# Patient Record
Sex: FEMALE | Race: BLACK OR AFRICAN AMERICAN | Employment: FULL TIME | URBAN - METROPOLITAN AREA
[De-identification: names, ages, dates, MRNs, and addresses within clinical notes are randomized per-mention and may not be internally consistent; named-entity substitution may affect disease eponyms.]

---

## 2022-04-11 ENCOUNTER — TELEPHONE (OUTPATIENT)
Dept: SURGERY | Age: 47
End: 2022-04-11

## 2022-04-11 NOTE — TELEPHONE ENCOUNTER
Need patients new insurance. Called patient and left a voicemail to remind her of her upcoming appointment, our cancellation policy, our late policy and no visitor rule. Unable to ask COVID-19 screening questions due to no answer.

## 2022-04-12 ENCOUNTER — OFFICE VISIT (OUTPATIENT)
Dept: SURGERY | Age: 47
End: 2022-04-12
Payer: COMMERCIAL

## 2022-04-12 ENCOUNTER — TELEPHONE (OUTPATIENT)
Dept: SURGERY | Age: 47
End: 2022-04-12

## 2022-04-12 VITALS
RESPIRATION RATE: 18 BRPM | DIASTOLIC BLOOD PRESSURE: 85 MMHG | OXYGEN SATURATION: 96 % | HEIGHT: 65 IN | WEIGHT: 262.6 LBS | BODY MASS INDEX: 43.75 KG/M2 | HEART RATE: 94 BPM | SYSTOLIC BLOOD PRESSURE: 132 MMHG | TEMPERATURE: 98 F

## 2022-04-12 DIAGNOSIS — K21.9 CHRONIC GERD: ICD-10-CM

## 2022-04-12 DIAGNOSIS — E66.01 MORBID OBESITY (HCC): Primary | ICD-10-CM

## 2022-04-12 DIAGNOSIS — R11.10 REGURGITATION OF FOOD: ICD-10-CM

## 2022-04-12 PROCEDURE — S2083 ADJUSTMENT GASTRIC BAND: HCPCS | Performed by: NURSE PRACTITIONER

## 2022-04-12 PROCEDURE — 99202 OFFICE O/P NEW SF 15 MIN: CPT | Performed by: NURSE PRACTITIONER

## 2022-04-12 NOTE — TELEPHONE ENCOUNTER
Patient called and said she was seen this morning by Lelo Ponce. Patient stated Rebeka Busch put her on a liquid diet. Patient would like to talk more to Rebeka Busch about this and if Rebeka Busch could email her a list of the best liquid diets or what she recommends. Please advise.

## 2022-04-12 NOTE — PROGRESS NOTES
Chief Complaint   Patient presents with   [address] [facility] Patient     bariatric f/u     Dysphagia       Char Johnson presents 12 years status post laparoscopic adjustable gastric band for treatment of morbid obesity for obesity management and to reestablish bariatric care. She has not been in bariatric follow-up for more than 5 years. She says recently she feels like something \"is not right\". This morning she had a hard time tolerating a protein shake. She said she burps a lot and feels like liquids just sit in her chest.  This has been worsening over the past week. She said no associated epigastric or port site pain, no fevers or chills, no hematemesis and no melena. Normally she said she self-induced vomiting 3-4 times a week because she feels miserable like it sitting in her chest.  She developed maladaptive eating and relies heavily on snack items and junk because they go down. She has difficulty tolerating textured and solid foods. Band type AP standard with last recorded fill volume of 8.5 mL (11/19/2013)    Preop weight  320 pounds  Ang   ±250 pounds  Current weight  262 pounds    Denies port site pain  Denies hunger    Says she has been trying to diet and recently lost about 15 pounds  She has been on and off phentermine did not feel like it helped very much  Her only medication is Cymbalta    Visit Vitals  /85 (BP 1 Location: Right arm, BP Patient Position: Sitting, BP Cuff Size: Adult)   Pulse 94   Temp 98 °F (36.7 °C) (Oral)   Resp 18   Ht 5' 5\" (1.651 m)   Wt 262 lb 9.6 oz (119.1 kg)   SpO2 96%   BMI 43.70 kg/m²     A + O x 3  Chest  CTA  COR  RRR  ABD Soft, obese, port site is palpable and nontender left mid abdomen/ND, +BS. EXT No edema; ambulating independently      ICD-10-CM ICD-9-CM    1. Morbid obesity ([facility] [address]  E66.01 278.01 ADJUSTMENT GASTRIC BAND      XR UPPER GI SERIES W KUB      REFERRAL TO GASTROENTEROLOGY   2.  BMI 40.0-44.9, adult ([facility])  Z68.41 V85.41 ADJUSTMENT GASTRIC BAND XR UPPER GI SERIES W KUB      REFERRAL TO GASTROENTEROLOGY   3. Regurgitation of food  R11.10 787.03 ADJUSTMENT GASTRIC BAND      XR UPPER GI SERIES W KUB      REFERRAL TO GASTROENTEROLOGY   4. Chronic GERD  K21.9 530.81 ADJUSTMENT GASTRIC BAND      XR UPPER GI SERIES W KUB      REFERRAL TO GASTROENTEROLOGY     12 years status post laparoscopic adjustable gastric band for treatment of morbid obesity  Worsening dysphagia and symptoms concerning for gastric band slip  Recommended band decompression today and then proceed with upper GI and upper endoscopy to further evaluate for band slip, hiatal hernia, band erosion   Reviewed maladaptive eating behavior and how that is a contributor to weight gain  Briefly discussed with her surgical options including #1 band removal and refer to medical weight loss, #2 band removal and at a later date revised to a gastric bypass  She may require more urgent band removal and then at a later date consider gastric bypass  I have asked her to meet with a dietitian  For now stay on a bariatric liquid diet   Lap Band adjustment  Procedure    Verbal consent was obtained. The patient was placed in the supine position. The port area was prepped using sterile technique and a Bingham needle was inserted. The port was accessed with ease (with valsalva). Previous Fill Volume:  8.5 cc. Removed:  7 cc clear appearing fluid   Patient tolerated the procedure well. She did feel somewhat gurgly and was belching with a couple ounces of water but it was tolerable and she did not vomit  Again I reminded her to stay on a full liquid diet  Have her follow-up after the upper GI and endoscopy to discuss surgical options further with Dr. Dimas Briones  If she develops abdominal pain and/or intolerance to liquid she needs to be seen promptly for more urgent band removal    Ifrah Ortega verbalized understanding and questions were answered to the best of my knowledge and ability.     Diet, UGI and EGD educational materials were provided.

## 2022-04-12 NOTE — PATIENT INSTRUCTIONS
Next Steps:    Upper GI xray and see me in the office after the xray (you will need an appt [phone])  Central Scheduling contact   [phone]     Upper endoscopy Dr. Meghna Lebron [phone]     After upper GI and endoscopy plan on an appt with Fracisco Gotti MD to discuss options    - band removal    - possible revision to gastric bypass        make an appointment with one of the bariatric dietitians, please call the bariatric line at [phone]. Appointments are offered in person and virtual at no charge.

## 2022-04-12 NOTE — PROGRESS NOTES
1. Have you been to the ER, urgent care clinic since your last visit? Hospitalized since your last visit? No     2. Have you seen or consulted any other health care providers outside of the [address] [place] [address] since your last visit? Include any pap smears or colon screening.  No      Neck: 14in  Waist:46.5in  Hips: 52.5in

## 2022-04-12 NOTE — TELEPHONE ENCOUNTER
Bariatric liquids/ stage I soft  and will send via My Chart   Tolerated soup at lunch   UGI is Friday and EGD 5/11

## 2022-04-13 ENCOUNTER — DOCUMENTATION ONLY (OUTPATIENT)
Dept: SURGERY | Age: 47
End: 2022-04-13

## 2022-04-13 ENCOUNTER — TELEPHONE (OUTPATIENT)
Dept: SURGERY | Age: 47
End: 2022-04-13

## 2022-04-13 NOTE — PROGRESS NOTES
Faxed referral to [facility] with confirmation. Requested a URGENT appointment.

## 2022-04-13 NOTE — TELEPHONE ENCOUNTER
Called pt to see what her plan was to see if she wanted the band removal only or if she is considering the band removal with the gastric bypass. Pt answered and stated that she does not know what she plans to continue with at the moment and will call me to let me know on what she has decided with. Gave pt my direct number to keep me updated. Will wait for pt to let me know before moving forward.

## 2022-04-15 ENCOUNTER — HOSPITAL ENCOUNTER (OUTPATIENT)
Dept: GENERAL RADIOLOGY | Age: 47
Discharge: HOME | End: 2022-04-15
Attending: NURSE PRACTITIONER
Payer: COMMERCIAL

## 2022-04-15 DIAGNOSIS — R11.10 REGURGITATION OF FOOD: ICD-10-CM

## 2022-04-15 DIAGNOSIS — E66.01 MORBID OBESITY (HCC): ICD-10-CM

## 2022-04-15 DIAGNOSIS — K21.9 CHRONIC GERD: ICD-10-CM

## 2022-04-15 PROCEDURE — 74240 X-RAY XM UPR GI TRC 1CNTRST: CPT

## 2022-08-11 PROBLEM — D64.9 ANEMIA: Status: ACTIVE | Noted: 2022-08-11

## 2022-08-11 PROBLEM — F32.9 MAJOR DEPRESSIVE DISORDER: Status: ACTIVE | Noted: 2022-08-11

## 2022-08-11 PROBLEM — Z86.59 PERSONAL HISTORY OF MENTAL DISORDER: Status: ACTIVE | Noted: 2022-08-11

## 2022-08-11 PROBLEM — R63.5 ABNORMAL WEIGHT GAIN: Status: ACTIVE | Noted: 2022-08-11

## 2022-08-11 PROBLEM — Z86.2 PERSONAL HISTORY OF DISEASES OF BLOOD AND BLOOD-FORMING ORGANS: Status: ACTIVE | Noted: 2022-08-11

## 2022-08-11 PROBLEM — E78.00 HIGH CHOLESTEROL: Status: ACTIVE | Noted: 2022-08-11

## 2022-08-11 RX ORDER — LANOLIN ALCOHOL/MO/W.PET/CERES
325 CREAM (GRAM) TOPICAL 3 TIMES DAILY
COMMUNITY
Start: 2022-05-17

## 2022-08-11 RX ORDER — PHENTERMINE HYDROCHLORIDE 37.5 MG/1
37.5 TABLET ORAL DAILY
COMMUNITY
Start: 2022-06-24

## 2022-08-16 ENCOUNTER — PATIENT MESSAGE (OUTPATIENT)
Dept: SURGERY | Age: 47
End: 2022-08-16

## 2022-08-16 DIAGNOSIS — K21.9 CHRONIC GERD: Primary | ICD-10-CM

## 2022-08-16 DIAGNOSIS — R63.5 WEIGHT GAIN: ICD-10-CM

## 2022-08-18 NOTE — TELEPHONE ENCOUNTER
From: Andi Scott  To: Edison Mar NP  Sent: 8/16/2022 9:49 AM EDT  Subject: Daija Carlton Valadez    Good morning,  I would like to make an appointment to have my lapband refilled. Since the fluid was taken out I have gained a substantial amount of weight.      Thank you,  Quentin Aguila

## 2022-08-24 ENCOUNTER — HOSPITAL ENCOUNTER (OUTPATIENT)
Dept: GENERAL RADIOLOGY | Age: 47
Discharge: HOME | End: 2022-08-24
Attending: NURSE PRACTITIONER
Payer: COMMERCIAL

## 2022-08-24 DIAGNOSIS — K21.9 CHRONIC GERD: ICD-10-CM

## 2022-08-24 DIAGNOSIS — R63.5 WEIGHT GAIN: ICD-10-CM

## 2022-08-24 PROCEDURE — 74240 X-RAY XM UPR GI TRC 1CNTRST: CPT

## 2022-09-13 ENCOUNTER — OFFICE VISIT (OUTPATIENT)
Dept: SURGERY | Age: 47
End: 2022-09-13
Payer: COMMERCIAL

## 2022-09-13 VITALS
BODY MASS INDEX: 47.62 KG/M2 | OXYGEN SATURATION: 97 % | DIASTOLIC BLOOD PRESSURE: 87 MMHG | HEART RATE: 85 BPM | RESPIRATION RATE: 15 BRPM | SYSTOLIC BLOOD PRESSURE: 137 MMHG | HEIGHT: 65 IN | WEIGHT: 285.8 LBS | TEMPERATURE: 98 F

## 2022-09-13 DIAGNOSIS — K95.09 GASTRIC BAND SLIPPAGE: ICD-10-CM

## 2022-09-13 DIAGNOSIS — R63.5 WEIGHT GAIN: ICD-10-CM

## 2022-09-13 DIAGNOSIS — E66.01 MORBID OBESITY (HCC): Primary | ICD-10-CM

## 2022-09-13 DIAGNOSIS — Z98.84 HISTORY OF LAPAROSCOPIC ADJUSTABLE GASTRIC BANDING: ICD-10-CM

## 2022-09-13 PROCEDURE — 99213 OFFICE O/P EST LOW 20 MIN: CPT | Performed by: NURSE PRACTITIONER

## 2022-09-13 NOTE — PROGRESS NOTES
Chief Complaint   Patient presents with    Morbid Obesity     Discuss band fill. Maximo Roman presents 12+ years status post laparoscopic adjustable gastric band for treatment of morbid obesity for obesity management hoping for an adjustment today. She has gained another 20 lbs. Band was decompressed a few months ago due to early signs of band slip. She has experienced additional weight gain and wants to \"keep trying with the band\"   No epigastric or port site pain, no fevers or chills, no hematemesis and no melena. Can \"eat whatever I want now\" and zero restriction   She had developed maladaptive eating and relied heavily on snack items and junk because they go down. Band type        AP standard with last recorded fill volume of 8.5 mL (11/19/2013)    4/12/22 decompressed and fill volume 1 ml      Preop weight               320 pounds  Ang                            ±250 pounds  Current weight             285 pounds     Denies port site pain  Denies hunger  Primary caregiver for aging parents. Father has dementia and is on dialysis and mother has mobility issues + she works FT     Visit Vitals  /87 (BP 1 Location: Right upper arm, BP Patient Position: Sitting, BP Cuff Size: Large adult)   Pulse 85   Temp 98 °F (36.7 °C) (Oral)   Resp 15   Ht 5' 5\" (1.651 m)   Wt 285 lb 12.8 oz (129.6 kg)   SpO2 97%   BMI 47.56 kg/m²     A + O x 3  Chest  CTA  COR  RRR  ABD Soft, NT/ND, +BS, port faintly palpable left mid abd and non tender   EXT No edema; ambulating independently         ICD-10-CM ICD-9-CM    1. Morbid obesity ([address]  E66.01 278.01       2. BMI 45.0-49.9, adult ([facility])  Z68.42 V85.42       3. History of laparoscopic adjustable gastric banding  Z98.84 V45.86       4. Weight gain  R63.5 783.1       5.  Gastric band slippage  K95.09 996.59         UGI images reviewed facet to face with patient   UGI recently was improved from April   Agreed to small adjustment, but was clear I did not think it would be helpful and she should consider band removal and revision to a gastric bypass   At best post band her BMI remained > 40 and she has developed poor eating behaviors     Lap Band adjustment  Procedure    Verbal consent was obtained. The patient was placed in the supine and standing (both attempted) position. The port area was prepped using sterile technique and a Bingham needle was inserted. The port was accessed NOT accessed. Port is flipped and attempts were made by Malik Quintana NP and myself without success. Previous Fill Volume:  1 ml. Port unable to access (could feel the metal portion of the port and was very tilted/ flipped     Patient tolerated the procedure well. Referred to bariatric seminar, RD evaluation and then to see Dr. Beth Araiza to discuss revision to gastric bypass   She has [facility] care and will need 12 month diet/wellness program per GIA guidelines which she understands    I have encouraged her to reach out to [facility] for some guidance and support with her parents   Vivian Stone verbalized understanding and questions were answered to the best of my knowledge and ability. Gastric bypass educational materials were provided.

## 2022-09-13 NOTE — PROGRESS NOTES
1. Have you been to the ER, urgent care clinic since your last visit? Hospitalized since your last visit? No    2. Have you seen or consulted any other health care providers outside of the [address] [place] [address] since your last visit? Include any pap smears or colon screening.  yes

## 2022-09-13 NOTE — PATIENT INSTRUCTIONS
NEXT STEPS:     Surgery type: Possible revision to a gastric bypass   Surgeon:  Ashanti Lim MD      Watch the On line seminar on gastric bypass. Go to [employer] to register      2. Make an appointment with one of the bariatric dietitians to get started with your nutrition classes. Please call the bariatric line at [phone]. Appointments are offered in person and virtual at no charge. 3. After the seminar and dietician appt, make an appt to see Dr. Ashanti Lim to talk about gastric bypass       **Insurance requires a certain number of months of supervised counseling for weight loss, exercise and nutrition before approval for surgery. **    Also with regard to your parents, you can reach out to the [employer] or [employer] and they are a great resource for caregivers, children of aging parents and they may be able to help guide you as you care for your parents.

## 2022-09-15 ENCOUNTER — TELEPHONE (OUTPATIENT)
Dept: SURGERY | Age: 47
End: 2022-09-15

## 2022-09-15 NOTE — TELEPHONE ENCOUNTER
Spoke with Ms Umu Key regarding her steps Yunior wanted her to complete before coming in to see Dr Veronica Villagran. Pt aware of the steps below and once completed she can reach me to schedule her appt. I have emailed her the steps as to the e-mail she requested. Nutrition Eval - Please call [phone]    Online Seminar - [email or web address]    Appt with Dr Shahida Rodriguez once the two steps above have been completed - Call me at [phone]

## 2022-09-27 ENCOUNTER — CLINICAL SUPPORT (OUTPATIENT)
Dept: SURGERY | Age: 47
End: 2022-09-27

## 2022-09-27 DIAGNOSIS — E66.01 MORBID OBESITY (HCC): Primary | ICD-10-CM

## 2022-09-27 NOTE — PROGRESS NOTES
Pre-operative Bariatric Nutrition Evaluation - Phone Consult     Date: 2022   Fanny Meneses M.D. Name: Domonique Salgado  :  1975  Age:  41  Gender: Female   Type of Surgery: [x]           Gastric Bypass   []           Sleeve Gastrectomy    ASSESSMENT:    Medications/Supplements:   Prior to Admission medications    Medication Sig Start Date End Date Taking? Authorizing Provider   ferrous sulfate 325 mg (65 mg iron) tablet Take 325 mg by mouth three (3) times daily. 22   Provider, Historical   phentermine (ADIPEX-P) 37.5 mg tablet Take 37.5 mg by mouth in the morning. 22   Provider, Historical   CYMBALTA 30 mg capsule  10/16/13   Provider, Historical       Food Allergies/Intolerances:lactose intolerance     Anthropometrics:    Ht:65\"   Recent Office Wt: 285#    IBW: 135#    %IBW: 211%     BMI:47    Category: obesity III     Reported wt history: Pt completing pre-op nutrition evaluation for wt loss surgery over the phone. Pt with previous LAGB with Dr. Estee Farmer over 10 years ago. States initially did well and lost about 50#. Reports lowest adult BW of 225# and highest adult BW of 285#. Attributes wt gain over the years r/t emotional eating. Has attempted wt loss through various methods with most successful wt loss of 50#. Pt's band has flipped and has since been deflated. Has been unable to maintain long term or significant wt loss and is now seeking approval for weight loss surgery. Pt will need to complete 12 months of health coaching through [employer] and at least 4 nutrition visits with our program per our program requirements.       Exercise/Physical Activity: was previously exercising in the morning (walking or You Tube videos) 2-3 times per week (2 months ago)     Reported Diet History: LAGB     24 Hour Diet Recall - drinks a lot of calories; long stretches of time without eating, 1st meal of day at 4 pm  Breakfast  Usually skips or if cooks at home (sausage, grits)    Lunch Salads (sometimes homemade, sometimes take out) with chicken, veggies, feta cheese, dressing ([place] or [place] dressing)    Dinner  Take out    [place] at night - watching tv, stress with care taking role - fries, chocolate chip cookies   Beverages  Coffee, latte, sweet tea, diet soda, water        Environment/Psychosocial/Support:Pt works for [employer] and works from home majority of the time (in the office 2 days per week). 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 Pt grocery shops for her household as well as her parents. Pt's brother had a gastric bypass. NUTRITION DIAGNOSIS:  Physical inactivity r/t multiple etiologies evidenced by pt with no current exercise. Excessive energy intake r/t food and beverage choices evidenced by diet recall. Heavy reliance on take out/restaurant meals. Beverage choices are mostly sugar-sweetened. NUTRITION INTERVENTION:  Pt educated on nutrition recommendations for weight loss surgery, specifically gastric bypass. Instructed on consuming 3 meals per day starting now. Use the balanced plate method to plan meals, include 3 oz of lean source of protein, 1/2 cup whole grains, unlimited non-starchy vegetables, 1/2 cup fruit and 1 serving of low fat dairy. Utilize handouts listing healthy snack and meal ideas to limit restaurant meals. After surgery measure all meals to 1/2 cup. Each meal will contain a 1/4 cup lean protein and 1/4 cup fruit, non-starchy vegetable or starch (limiting to once per day). Aim for 60 g protein per day. Sip on 48-64 oz of sugar free, calorie free, non-carbonated beverages each day. Do not use a straw. Do not consume beverages 30 minutes before, during or 30 minutes after meals. Read all nutrition labels. Demonstrated and emphasized identifying serving size, total fat, sugar and protein content. Defined low fat as </= 3 g per serving.  Discussed lean and extra lean sources of protein. Provided list of low fat cooking methods. Avoid foods with sugar listed in the first 3 ingredients and >/15 g sugar per serving. Excess sugar/fat intake may lead to dumping syndrome. Discussed signs and symptoms of dumping syndrome. Practice mindful eating habits; take small bites, chew thoroughly, avoid distractions, utilize hunger/fullness scale. Consume meals over 20-30 minutes. Attend Bariatric Support Group and increase physical activity (approved per MD) for long term weight maintenance. NUTRITION MONITORING AND EVALUATION:    The following goals were established with patient; Implement physical activity/exercise. We discussed multiple benefits of exercising including stress management, decreased night time snacking, improved sleep patterns. Replace current beverages with sugar-free and non-carbonated alternatives. Eventually will need to decrease frequency of eating out to no more than 2-3 meals per week, more meal prepping/cooking at home for optimal weight loss and to prevent risk for dumping syndrome. Eat more consistently rather than skipping meals. This may also help improve snacking habits later in the day/night. Can use protein shakes in place of skipping meals. Print/review nutrition education materials. Follow up next month for continued nutrition education and supervised weight loss. Specific tips and techniques to facilitate compliance with above recommendations were provided and discussed. Nutrition evaluation reveals important lifestyle changes are indicated. Goals set and recommendations made. Will continue to assess. If further details are desired please feel free to contact me at [phone]. This phone number was also provided to the patient for any further questions or concerns.            Osmin Davis RD

## 2022-11-21 ENCOUNTER — OFFICE VISIT (OUTPATIENT)
Dept: SURGERY | Age: 47
End: 2022-11-21

## 2022-11-21 ENCOUNTER — CLINICAL SUPPORT (OUTPATIENT)
Dept: SURGERY | Age: 47
End: 2022-11-21

## 2022-11-21 VITALS
OXYGEN SATURATION: 97 % | RESPIRATION RATE: 18 BRPM | TEMPERATURE: 98.6 F | HEART RATE: 94 BPM | SYSTOLIC BLOOD PRESSURE: 139 MMHG | BODY MASS INDEX: 48.82 KG/M2 | WEIGHT: 293 LBS | HEIGHT: 65 IN | DIASTOLIC BLOOD PRESSURE: 82 MMHG

## 2022-11-21 VITALS — BODY MASS INDEX: 48.92 KG/M2 | WEIGHT: 293 LBS

## 2022-11-21 DIAGNOSIS — E66.01 MORBID OBESITY (HCC): Primary | ICD-10-CM

## 2022-11-21 DIAGNOSIS — K95.09 GASTRIC BAND MALFUNCTION: ICD-10-CM

## 2022-11-21 PROCEDURE — 99214 OFFICE O/P EST MOD 30 MIN: CPT | Performed by: SURGERY

## 2022-11-21 NOTE — PROGRESS NOTES
Bariatric Surgery Consultation    CC: Obesity with history of gastric band  Subjective: The patient is a 41 y.o. obese  female with a Body mass index is 49.92 kg/m². . They have been considering surgery for some time now. The patient presents to the clinic today to discuss surgical weight loss options. They have made multiple attempts at weight loss over the years without success. They have tried diet, exercise, pills, and gastric band. Unfortunately, none of these have lead to meaningful, sustained weight loss. The patient now suffers from multiple medical conditions related to their obesity including joint pain HLD. Relevant previous surgical history includes: Lap band with HH repair. They have attended the bariatric seminar before the visit. The patient's goals for the surgery include: lose weight. Tobacco use: no  GERD/hiatal hernia: no. EGD done in May showed a 2 cm hiatal hernia with no band erosion. No reflux. Sleep Apnea assessment:    STOPBANG questionnaire     Do you Snore loudly? no  Do you often feel Tired, fatigued, or sleepy during the daytime? yes  Has anyone Observed you stop breathing during your sleep? no  Are you being treated for high blood Pressure? no  BMI more than 35 kg/m2?  yes  Age over 48years old? no  Neck Circumference >16 inches? no  Gender male? no  ______________________________________     SCORE: 2     If YES to 0 - 2, low risk of sleep apnea  If YES to 3 - 4 intermediate risk of having sleep apnea  If YES to 5 - 8 high risk of having sleep apnea (or 2 + BMI 35 or Neck > 17\" or Male)       Patient Active Problem List    Diagnosis Date Noted    Anemia 08/11/2022    Abnormal weight gain 08/11/2022    Major depressive disorder 08/11/2022    Personal history of diseases of blood and blood-forming organs 08/11/2022    Personal history of mental disorder 08/11/2022    High cholesterol 08/11/2022    Encounter for adjustment of gastric lap band 08/13/2010 Obesities, morbid ([facility] 10/29/2009      Past Medical History:   Diagnosis Date    Abnormal weight gain 8/11/2022    Anemia 8/11/2022    Edema leg     edilma LE    High cholesterol 8/11/2022    Hypertension     Major depressive disorder 8/11/2022    Obesities, morbid ([facility] 10/29/2009    Personal history of diseases of blood and blood-forming organs 8/11/2022    Personal history of mental disorder 8/11/2022     Past Surgical History:   Procedure Laterality Date    HC LAP BAND ADJUST PROCEDURE  2/16/10    fluoro    HC LAP BAND ADJUST PROCEDURE  4/12/10    office    HC LAP BAND ADJUST PROCEDURE  5/24/10    office    HC LAP BAND ADJUST PROCEDURE  6/23/10    office    SD ABDOMEN SURGERY PROC UNLISTED      SD LAP, PLACE ADJUST GASTR BAND  1-6-10    AP Std    REPAIR DIAPHR HERNIA,THOR/ABD  1-6-10    HH repair w/ LAGB      Social History     Tobacco Use    Smoking status: Never    Smokeless tobacco: Never   Substance Use Topics    Alcohol use: No      Family History   Problem Relation Age of Onset    Hypertension Mother     Diabetes Mother     OSTEOARTHRITIS Mother     Other Mother         obesity    Other Father         obesity    Kidney Disease Father     Hypertension Father     Hypertension Sister     Other Sister         obesity    Hypertension Brother     Other Brother         obesity      Prior to Admission medications    Medication Sig Start Date End Date Taking? Authorizing Provider   ferrous sulfate 325 mg (65 mg iron) tablet Take 325 mg by mouth three (3) times daily.  5/17/22  Yes Provider, Historical   phentermine (ADIPEX-P) 37.5 mg tablet Take 37.5 mg by mouth in the morning. 6/24/22  Yes Provider, Historical   CYMBALTA 30 mg capsule  10/16/13  Yes Provider, Historical     Allergies   Allergen Reactions    Amoxicillin Hives        Review of Systems:    Constitutional: No fever or chills  Neurologic: No headache  Eyes: No scleral icterus or irritated eyes  Nose: No nasal pain or drainage  Mouth: No oral lesions or sore throat  Cardiac: No palpations or chest pain  Pulmonary: No cough or shortness of breath  Gastrointestinal: No nausea, emesis, diarrhea, or constipation  Genitourinary: No dysuria  Musculoskeletal: No muscle or joint tenderness  Skin: No rashes or lesions  Psychiatric: No anxiety or depressed mood      Objective:     Physical Exam:  Visit Vitals  /82 (BP 1 Location: Left lower arm, BP Patient Position: Sitting, BP Cuff Size: Large adult)   Pulse 94   Temp 98.6 °F (37 °C) (Oral)   Resp 18   Ht 5' 5\" (1.651 m)   Wt 300 lb (136.1 kg)   SpO2 97%   BMI 49.92 kg/m²     General: No acute distress, conversant  Eyes: PERRLA, no scleral icterus  HENT: Normocephalic without oral lesions  Neck: Trachea midline without LAD  Cardiac: Normal pulse rate and rhythm  Pulmonary: Symmetric chest rise with normal effort  GI: Soft, obese, NT, ND, no hernia, no splenomegaly  Skin: Warm without rash  Extremities: No edema or joint stiffness  Psych: Appropriate mood and affect    Assessment:     Morbid obesity with comorbidities  Plan:   The patient presents today with multiple complications relating to their obesity as detailed above. The patient has made multiple unsuccessful attempts at weight loss as detailed above. The patient desires surgical weight loss for a better chance of lifelong weight control and control of co-morbidities. They have attended the bariatric seminar and meet the qualifications for surgery based on the NIH criteria. We have discussed the various surgical options including the robotic sleeve gastrectomy, robotic gastric bypass, laparoscopic vs robotic single anastomosis duodenal ileostomy (YOKASTA), and duodenal switch. We also discussed none operative alternatives. The patient is currently interested in the YOKASTA versus gastric bypass. I believe they are a good candidate for this procedure.     If she wants to proceed with the YOKASTA I will remove her band in the near future and perform the YOKASTA at a later date.  If bypass okay for a 1 stage procedure. They will need to a H pylori antigen ordered. The patient will be required to not gain weight during this period if starting BMI is 35-40, lose 5% of starting weight if BMI is >40, or lose 10% of starting weight if BMI >60 during the supervised weight loss / pre operative process before our next visit for pre-operative consents. The goal for this patient is to lose 15 lbs. We have discussed the possible complications of bariatric surgery which include but are not limited to failed weight loss, weight regain, malnutrition, leak, bleed, stricture, gastric ulcer, gastric fistula, gastric bleed, gallstones, new or worsening gastric reflux, nausea, emesis, internal hernia, abdominal wall hernia, gastric perforation, need for revision / conversion / or reversal, pregnancy complications and loss, intestinal ischemia, post operative skin complications, possible thinning of their hair, bowel obstruction, dumping syndrome, wound infection, blood clots (DVT, mesenteric thrombus, pulmonary embolism), increased addictive tendency, risk of anesthesia, and death. The patient understands this is a life altering decision and will require compliance to the program for the remainder of their life in order to be monitored to avoid complication and ensure successful, sustained weight control. They will be placed on a lifelong low carbohydrate and low sugar diet, exercise, and vitamin regimen and will require frequent blood draws and office visits to ensure adequate nutrition and program compliance. Visits and follow up will be in compliance with the guidelines set forth by [facility]. I have specifically mentioned the need to avoid all personal and second-hand tobacco exposure, systemic steroids, and NSAIDS after any bariatric surgery to help avoid the above listed complications.  The patient has expressed understanding of the above and would like to enroll in the program. The patient will be submitted for medical and psychological clearance along with establishing with out dietician and joining the pre / post operative support group. They will be screened for depression and sleep apnea and treated pre operatively if needed. The patient will have to demonstrate cessation of tobacco if relevant for at least 3 months preoperatively along with having a controlled HbA1c less than 8. After successful completion of the preoperative regimen the patient will be submitted for insurance approval and pending this will be scheduled for surgery. All questions from the patient have been answered and they have demonstrated appropriate understanding of the process.       Patient Active Problem List    Diagnosis Date Noted    Anemia 08/11/2022    Abnormal weight gain 08/11/2022    Major depressive disorder 08/11/2022    Personal history of diseases of blood and blood-forming organs 08/11/2022    Personal history of mental disorder 08/11/2022    High cholesterol 08/11/2022    Encounter for adjustment of gastric lap band 08/13/2010    Obesities, morbid ([facility] 10/29/2009         Signed By: Ricardo Clifford MD  Bariatric and General Surgeon  [facility]    November 21, 2022

## 2022-11-21 NOTE — PROGRESS NOTES
Pre-operative Bariatric Nutrition Evaluation - Office Visit Follow Up      Date: 2022   Avery Sultana M.D. Name: Black Freeman                  :   1975        Age:    41                    Gender: Female   Type of Surgery: [x]           Gastric Bypass                []           Sleeve Gastrectomy     ASSESSMENT:     Medications/Supplements:           Prior to Admission medications    Medication Sig Start Date End Date Taking? Authorizing Provider   ferrous sulfate 325 mg (65 mg iron) tablet Take 325 mg by mouth three (3) times daily. 22     Provider, Historical   phentermine (ADIPEX-P) 37.5 mg tablet Take 37.5 mg by mouth in the morning. 22     Provider, Historical   CYMBALTA 30 mg capsule   10/16/13     Provider, Historical         Food Allergies/Intolerances:lactose intolerance      Anthropometrics:    Ht:65\"                            Today's Office Wt: 294#   Previous Office Wt: 285#     Net Change: 9# wt gain                              BMI:48  (obesity III)      Reported wt history: Pt completing follow up nutrition evaluation and counseling in preparation for possible removal of LAGB and conversion to gastric bypass. Pt with previous LAGB with Dr. Shobha Gomez over 10 years ago. States initially did well and lost about 50#. Reports lowest adult BW of 225# and highest adult BW of 285#. Attributes wt gain over the years r/t emotional eating. Since her nutrition evaluation on 22 pt reports minimal lifestyle changes made d/t caring for ill father with dementia and on dialysis. States he has been hospitalized twice in the past few weeks. Reports difficulty focusing on her own needs. Reports she has increased her water intake but is still drinking sugar sweetened beverages. Has tried protein shakes but is not using them consistently. Today's wt indicates a 9# wt gain since her last office visit.  Pt is scheduled to meet with Dr. Judy Villanueva today to discuss revision options. Will then likely need to complete 12 months of health coaching through [facility] [employer] and 4-6 nutrition visits with our program.      Exercise/Physical Activity: none d/t lack of time     Reported Diet History: LAGB      24 Hour Diet Recall - drinks a lot of calories; long stretches of time without eating, 1st meal of day at 4 pm  Breakfast   Usually skips or if cooks at home (sausage, grits)    Lunch   Salads (sometimes homemade, sometimes take out) with chicken, veggies, feta cheese, dressing ([place] or [place] dressing)    Dinner   Take out    [employer] at night - watching tv, stress with care taking role - fries, chocolate chip cookies   Beverages   Coffee with sugar or Splenda, sugar sweetened tea,water          Environment/Psychosocial/Support: 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 Pt grocery shops for her household as well as her parents. Pt's brother lives in [place] and has had a gastric bypass within the past year. NUTRITION DIAGNOSIS:  Self-monitoring deficit rt multiple etiologies evidenced by pt skips meals and lacks adequate protein intake. Excessive energy intake r/t food and beverage choices evidenced by diet recall. Heavy reliance on take out/restaurant meals. Beverage choices are mostly sugar-sweetened. NUTRITION INTERVENTION:  Pt educated on nutrition recommendations for weight loss surgery, specifically gastric bypass. Instructed on consuming 3 meals per day starting now. Use the balanced plate method to plan meals, include 3 oz of lean source of protein, 1/2 cup whole grains, unlimited non-starchy vegetables, 1/2 cup fruit and 1 serving of low fat dairy. Utilize handouts listing healthy snack and meal ideas to limit restaurant meals. After surgery measure all meals to 1/2 cup.  Each meal will contain a 1/4 cup lean protein and 1/4 cup fruit, non-starchy vegetable or starch (limiting to once per day). Aim for 60 g protein per day. Sip on 48-64 oz of sugar free, calorie free, non-carbonated beverages each day. Do not use a straw. Do not consume beverages 30 minutes before, during or 30 minutes after meals. Read all nutrition labels. Demonstrated and emphasized identifying serving size, total fat, sugar and protein content. Defined low fat as </= 3 g per serving. Discussed lean and extra lean sources of protein. Provided list of low fat cooking methods. Avoid foods with sugar listed in the first 3 ingredients and >/15 g sugar per serving. Excess sugar/fat intake may lead to dumping syndrome. Discussed signs and symptoms of dumping syndrome. Practice mindful eating habits; take small bites, chew thoroughly, avoid distractions, utilize hunger/fullness scale. Consume meals over 20-30 minutes. Attend Bariatric Support Group and increase physical activity (approved per MD) for long term weight maintenance. NUTRITION MONITORING AND EVALUATION:     The following goals were established with patient;  Continue to increase water intake and decrease intake of sugar sweetened beverages. Can use calorie-free sweeteners PRN. Explained risk for dumping syndrome. Eat more consistently with 3 meals a day and 1 planned snack to achieve adequate protein intake. Use protein shakes PRN instead of skipping meals. List of recommended shakes provided. Review nutrition education materials provided today. Follow up with RD once next steps established for revision process. Specific tips and techniques to facilitate compliance with above recommendations were provided and discussed. Nutrition evaluation reveals important lifestyle changes are indicated. Goals set and recommendations made. Will continue to assess. If further details are desired please feel free to contact me at [phone]. This phone number was also provided to the patient for any further questions or concerns. Lacy Bourgeois, RD

## 2022-11-21 NOTE — PROGRESS NOTES
Identified pt with two pt identifiers (name and ). Reviewed chart in preparation for visit and have obtained necessary documentation. Spencer Almonte is a 41 y.o. female  No chief complaint on file. Visit Vitals  /82 (BP 1 Location: Left lower arm, BP Patient Position: Sitting, BP Cuff Size: Large adult)   Pulse 94   Temp 98.6 °F (37 °C) (Oral)   Resp 18   Ht 5' 5\" (1.651 m)   Wt 300 lb (136.1 kg)   SpO2 97%   BMI 49.92 kg/m²       1. Have you been to the ER, urgent care clinic since your last visit? Hospitalized since your last visit? No    2. Have you seen or consulted any other health care providers outside of the [address] [place] [address] since your last visit? Include any pap smears or colon screening.  No

## 2023-01-16 ENCOUNTER — HOSPITAL ENCOUNTER (EMERGENCY)
Age: 48
Discharge: HOME | End: 2023-01-16
Attending: EMERGENCY MEDICINE
Payer: COMMERCIAL

## 2023-01-16 VITALS
OXYGEN SATURATION: 98 % | SYSTOLIC BLOOD PRESSURE: 143 MMHG | BODY MASS INDEX: 48.82 KG/M2 | WEIGHT: 293 LBS | RESPIRATION RATE: 18 BRPM | HEIGHT: 65 IN | HEART RATE: 101 BPM | DIASTOLIC BLOOD PRESSURE: 75 MMHG | TEMPERATURE: 97.8 F

## 2023-01-16 DIAGNOSIS — U07.1 COVID-19: Primary | ICD-10-CM

## 2023-01-16 LAB
FLUAV RNA SPEC QL NAA+PROBE: NOT DETECTED
FLUBV RNA SPEC QL NAA+PROBE: NOT DETECTED
SARS-COV-2, COV2: DETECTED

## 2023-01-16 PROCEDURE — 99283 EMERGENCY DEPT VISIT LOW MDM: CPT

## 2023-01-16 PROCEDURE — 87636 SARSCOV2 & INF A&B AMP PRB: CPT

## 2023-01-16 NOTE — Clinical Note
Work/School Note    Date: 1/16/2023     To Whom It May concern:    Ayde Conti was evaluated by the following provider(s):  Attending Provider: Flip Walker MD.   Sujey Brionese virus is suspected. Per the CDC guidelines we recommend home isolation until the following conditions are all met:    1. At least five days have passed since symptoms first appeared and/or had a close exposure,   2. After home isolation for five days, wearing a mask around others for the next five days,  3. At least 24 have passed since last fever without the use of fever-reducing medications and  4.  Symptoms (eg cough, shortness of breath) have improved      Sincerely,          Sheri Freire MD

## 2023-01-16 NOTE — DISCHARGE INSTRUCTIONS
I recommend ibuprofen and Tylenol as needed for fever, body aches, headaches. Make sure you are drinking lots of water, Pedialyte or Gatorade. Claritin or Zyrtec which can be purchased over-the-counter can help with congestion and runny nose. If you test positive for COVID-19, stay home for at least 5 days and isolate from others in your home. You are likely most infectious during these first 5 days. You may end isolation after day 5 if you are fever-free for 24 hours (without the use of fever-reducing medication) and your symptoms are improving.

## 2023-01-16 NOTE — ED PROVIDER NOTES
,    EMERGENCY DEPARTMENT HISTORY AND PHYSICAL EXAM      Date: 1/16/2023  Patient Name: Lilly Mcardle    History of Presenting Illness     Chief Complaint   Patient presents with    Flu Like Symptoms       History Provided By: Patient    HPI: Lilly Mcardle, 41 y.o. female presents to the ED with CC of 1 day of cough, body aches, sore throat fevers, generalized weakness. She had some diarrhea yesterday. She has had her COVID shot but not flu shot. .       Patient denies SOB, chest pain, or any neurological symptoms. There are no other complaints, changes, or physical findings at this time. Past History     Past Medical History:  Past Medical History:   Diagnosis Date    Abnormal weight gain 8/11/2022    Anemia 8/11/2022    Edema leg     edilma LE    High cholesterol 8/11/2022    Hypertension     Major depressive disorder 8/11/2022    Obesities, morbid ([address] 10/29/2009    Personal history of diseases of blood and blood-forming organs 8/11/2022    Personal history of mental disorder 8/11/2022       Allergies: Allergies   Allergen Reactions    Amoxicillin Hives       Review of Systems   Vital signs and nursing notes reviewed  Review of Systems  As documetnted in HPI    Physical Exam   Visit Vitals  BP (!) 143/75   Pulse (!) 101   Temp 97.8 °F (36.6 °C)   Resp 18   Ht 5' 5\" (1.651 m)   Wt 136.1 kg (300 lb)   SpO2 98%   BMI 49.92 kg/m²     CONSTITUTIONAL: Alert, in no distress. Appears stated age. HEAD:  Normocephalic, atraumatic  EYES: EOM intact. No conjunctival injection or scleral icterus  Neck:  Supple. No meningismus  RESP: Normal with no work of breathing, speaking in full sentences. CV: Well perfused. NEURO: Alert with normal mentation, moving extremities spontaneously  PSYCH: Normal mood, normal affect   *    Medical Decision Making   Patient presents for COVID 19 testing with normal oxygen saturation and mild URI symptoms or COVID 19 exposure. COVID 19 testing was conducted and positive.  The patient was given quarantine/isolation recommendations and agrees with the plan to be discharged home. They were provided instructions to return for difficulty breathing, chest pain, altered mentation, or any other new or worsening symptoms. ED Course:   Initial assessment performed. The patients presenting problems have been discussed, and they are in agreement with the care plan formulated and outlined with them. I have encouraged them to ask questions as they arise throughout their visit. Critical Care Time: None    Disposition:  DISCHARGE NOTE:  The pt is ready for discharge. The pt's signs, symptoms, diagnosis, and discharge instructions have been discussed and pt has conveyed their understanding. The pt is to follow up as recommended or return to ER should their symptoms worsen. Plan has been discussed and pt is in agreement. PLAN:  1. Current Discharge Medication List        2. Follow-up Information       Follow up With Specialties Details Why Contact Info    Autumn Thornton NP Nurse Practitioner   [address] [place] [address]  [phone]            3. COVID Testing results will be called once available if positive. Patient should utilize [employer] to access results. 4. Take Tylenol or Ibuprofen as needed  5. Drink plenty of fluids  6. Return to ED if worse especially if any shortness of breath, chest pain or altered mentation. Diagnosis     Clinical Impression:   1. COVID-19        Please note that this dictation was completed with [employer], the computer voice recognition software. Quite often unanticipated grammatical, syntax, homophones, and other interpretive errors are inadvertently transcribed by the computer software. Please disregards these errors. Please excuse any errors that have escaped final proofreading.

## 2023-06-20 ENCOUNTER — TELEPHONE (OUTPATIENT)
Age: 48
End: 2023-06-20

## 2023-06-20 NOTE — TELEPHONE ENCOUNTER
Returned patients call regarding lab enter and PCP Support Form. Mailed info to patient.  [facility]

## 2024-04-12 ENCOUNTER — TELEPHONE (OUTPATIENT)
Age: 49
End: 2024-04-12

## 2024-04-23 ENCOUNTER — TELEPHONE (OUTPATIENT)
Age: 49
End: 2024-04-23

## 2024-04-23 NOTE — TELEPHONE ENCOUNTER
Attempted to call patient regarding [facility] Insurance Requirements. LVM to return call.

## 2025-02-06 ENCOUNTER — TELEPHONE (OUTPATIENT)
Age: 50
End: 2025-02-06

## 2025-02-06 NOTE — TELEPHONE ENCOUNTER
Identified patient with two patient identifiers (name and ). Reviewed chart in preparation for encounter and have obtained necessary documentation.     Called and spoke with patient regarding restart, Patient is scheduled with kyel for 2/10/25 at 1 PM.

## 2025-02-18 ENCOUNTER — OFFICE VISIT (OUTPATIENT)
Age: 50
End: 2025-02-18
Payer: COMMERCIAL

## 2025-02-18 ENCOUNTER — TELEPHONE (OUTPATIENT)
Age: 50
End: 2025-02-18

## 2025-02-18 ENCOUNTER — PREP FOR PROCEDURE (OUTPATIENT)
Age: 50
End: 2025-02-18

## 2025-02-18 VITALS
HEIGHT: 65 IN | OXYGEN SATURATION: 99 % | HEART RATE: 99 BPM | DIASTOLIC BLOOD PRESSURE: 83 MMHG | RESPIRATION RATE: 20 BRPM | TEMPERATURE: 97.9 F | BODY MASS INDEX: 48.82 KG/M2 | SYSTOLIC BLOOD PRESSURE: 139 MMHG | WEIGHT: 293 LBS

## 2025-02-18 DIAGNOSIS — E66.01 MORBID OBESITY: ICD-10-CM

## 2025-02-18 DIAGNOSIS — K95.09 GASTRIC BAND MALFUNCTION: ICD-10-CM

## 2025-02-18 DIAGNOSIS — E88.810 METABOLIC SYNDROME: ICD-10-CM

## 2025-02-18 DIAGNOSIS — K21.9 GASTROESOPHAGEAL REFLUX DISEASE, UNSPECIFIED WHETHER ESOPHAGITIS PRESENT: Primary | ICD-10-CM

## 2025-02-18 PROCEDURE — 99214 OFFICE O/P EST MOD 30 MIN: CPT | Performed by: SURGERY

## 2025-02-18 ASSESSMENT — PATIENT HEALTH QUESTIONNAIRE - PHQ9
SUM OF ALL RESPONSES TO PHQ QUESTIONS 1-9: 2
1. LITTLE INTEREST OR PLEASURE IN DOING THINGS: SEVERAL DAYS
2. FEELING DOWN, DEPRESSED OR HOPELESS: SEVERAL DAYS
SUM OF ALL RESPONSES TO PHQ QUESTIONS 1-9: 2
SUM OF ALL RESPONSES TO PHQ9 QUESTIONS 1 & 2: 2

## 2025-02-18 NOTE — PROGRESS NOTES
Surgery Progress Note    2/18/2025    CC: Restart weight loss process    Subjective:     Patient comes back in today after being seen in 2022.  Considering KAROL versus bypass.  History of gastric band.  Band is decompressed.  Nothing getting stuck.  GERD is minimal.  She has had weight gain over the last few years.  She was caring for her sick father who has now passed.  Now focusing on her health.    They have made multiple attempts at weight loss over the years without success. They have tried diet, exercise, pills, and gastric band. Unfortunately, none of these have lead to meaningful, sustained weight loss. The patient now suffers from multiple medical conditions related to their obesity including joint pain.  Relevant previous surgical history includes: Lap band with HH repair. They have attended the bariatric seminar before the visit. The patient's goals for the surgery include: lose weight.     Tobacco use: no  GERD/hiatal hernia: no.  EGD done 3 years ago showed hiatal hernia with no band erosion.  No reflux.  Sleep Apnea assessment:     STOPBANG questionnaire     Do you Snore loudly? no  Do you often feel Tired, fatigued, or sleepy during the daytime? yes  Has anyone Observed you stop breathing during your sleep? no  Are you being treated for high blood Pressure? no  BMI more than 35 kg/m2? yes  Age over 50 years old? no  Neck Circumference >16 inches? no  Gender male? no  ______________________________________     SCORE: 2     If YES to 0 - 2, low risk of sleep apnea  If YES to 3 - 4 intermediate risk of having sleep apnea  If YES to 5 - 8 high risk of having sleep apnea (or 2 + BMI 35 or Neck > 17\" or Male)     Constitutional: No fever or chills  Neurologic: No headache  Eyes: No scleral icterus or irritated eyes  Nose: No nasal pain or drainage  Mouth: No oral lesions or sore throat  Cardiac: No palpations or chest pain  Pulmonary: No cough or shortness of breath  Gastrointestinal: No nausea, emesis,

## 2025-02-18 NOTE — TELEPHONE ENCOUNTER
Spoke to patient to schedule her EGD with Dr Yeager at [facility].  I offered 2/24 she declined due to needing a ride, so I offered 3/3 or 3/24 she went with 3/24 @ 8:00am with arrival time of 6:30am.    I let the patient know they are required to bring someone with them that will be responsible to take them home along with their photo ID and insurance card.      If you are taking any weight lose injectable medication, you need to stop one week before procedure                            Trulicity (dulaglutide)                          Wegovy (Semaglutide)                          Ozempic (Semaglutide)                          Rybelsus (tirzepatide)                          Mounjaro (tirzepatide)                           Zepbound (tirzepatide)        I let them know once this is scheduled I will put the information in a letter along with where they need to go and pre-procedure instructions.   This letter will post to their my chart and go out in the mail.  She acknowledged thank you

## 2025-02-18 NOTE — PROGRESS NOTES
Identified patient with two patient identifiers (name and ). Reviewed chart in preparation for visit and have obtained necessary documentation.    Vandana Jeong is a 49 y.o. female  Chief Complaint   Patient presents with    Bariatric, Initial Visit     Restart     /83 (Site: Left Lower Arm, Position: Sitting, Cuff Size: Large Adult)   Pulse 99   Temp 97.9 °F (36.6 °C) (Oral)   Resp 20   Ht 1.651 m (5' 5\")   Wt (!) 148.4 kg (327 lb 3.2 oz)   LMP 2025 (Approximate)   SpO2 99%   BMI 54.45 kg/m²     1. Have you been to the ER, urgent care clinic since your last visit?  Hospitalized since your last visit?no    2. Have you seen or consulted any other health care providers outside of the [facility] since your last visit?  Include any pap smears or colon screening. no     Patients heart rate is 101, elevated above normal range.  2nd recheck shows patient is 99, within normal range.  Provider made aware.

## 2025-02-19 LAB
25(OH)D3+25(OH)D2 SERPL-MCNC: 24.2 NG/ML (ref 30–100)
ALBUMIN SERPL-MCNC: 3.9 G/DL (ref 3.9–4.9)
ALP SERPL-CCNC: 180 IU/L (ref 44–121)
ALT SERPL-CCNC: 14 IU/L (ref 0–32)
AST SERPL-CCNC: 18 IU/L (ref 0–40)
BASOPHILS # BLD AUTO: 0.1 X10E3/UL (ref 0–0.2)
BASOPHILS NFR BLD AUTO: 1 %
BILIRUB SERPL-MCNC: 0.3 MG/DL (ref 0–1.2)
BUN SERPL-MCNC: 14 MG/DL (ref 6–24)
BUN/CREAT SERPL: 19 (ref 9–23)
CALCIUM SERPL-MCNC: 9.6 MG/DL (ref 8.7–10.2)
CHLORIDE SERPL-SCNC: 101 MMOL/L (ref 96–106)
CHOLEST SERPL-MCNC: 243 MG/DL (ref 100–199)
CO2 SERPL-SCNC: 23 MMOL/L (ref 20–29)
CREAT SERPL-MCNC: 0.72 MG/DL (ref 0.57–1)
EGFRCR SERPLBLD CKD-EPI 2021: 104 ML/MIN/1.73
EOSINOPHIL # BLD AUTO: 0.1 X10E3/UL (ref 0–0.4)
EOSINOPHIL NFR BLD AUTO: 2 %
ERYTHROCYTE [DISTWIDTH] IN BLOOD BY AUTOMATED COUNT: 15.8 % (ref 11.7–15.4)
FOLATE SERPL-MCNC: 12.4 NG/ML
GLOBULIN SER CALC-MCNC: 3.6 G/DL (ref 1.5–4.5)
GLUCOSE SERPL-MCNC: 91 MG/DL (ref 70–99)
HBA1C MFR BLD: 5.3 % (ref 4.8–5.6)
HCT VFR BLD AUTO: 35.2 % (ref 34–46.6)
HDLC SERPL-MCNC: 66 MG/DL
HGB BLD-MCNC: 10.7 G/DL (ref 11.1–15.9)
IMM GRANULOCYTES # BLD AUTO: 0 X10E3/UL (ref 0–0.1)
IMM GRANULOCYTES NFR BLD AUTO: 0 %
IRON SATN MFR SERPL: 11 % (ref 15–55)
IRON SERPL-MCNC: 42 UG/DL (ref 27–159)
LDLC SERPL CALC-MCNC: 162 MG/DL (ref 0–99)
LYMPHOCYTES # BLD AUTO: 2 X10E3/UL (ref 0.7–3.1)
LYMPHOCYTES NFR BLD AUTO: 27 %
MCH RBC QN AUTO: 23.8 PG (ref 26.6–33)
MCHC RBC AUTO-ENTMCNC: 30.4 G/DL (ref 31.5–35.7)
MCV RBC AUTO: 78 FL (ref 79–97)
MONOCYTES # BLD AUTO: 0.6 X10E3/UL (ref 0.1–0.9)
MONOCYTES NFR BLD AUTO: 8 %
NEUTROPHILS # BLD AUTO: 4.7 X10E3/UL (ref 1.4–7)
NEUTROPHILS NFR BLD AUTO: 62 %
PLATELET # BLD AUTO: 485 X10E3/UL (ref 150–450)
POTASSIUM SERPL-SCNC: 4.8 MMOL/L (ref 3.5–5.2)
PROT SERPL-MCNC: 7.5 G/DL (ref 6–8.5)
RBC # BLD AUTO: 4.5 X10E6/UL (ref 3.77–5.28)
SODIUM SERPL-SCNC: 136 MMOL/L (ref 134–144)
TIBC SERPL-MCNC: 395 UG/DL (ref 250–450)
TRIGL SERPL-MCNC: 86 MG/DL (ref 0–149)
TSH SERPL DL<=0.005 MIU/L-ACNC: 5.83 UIU/ML (ref 0.45–4.5)
UIBC SERPL-MCNC: 353 UG/DL (ref 131–425)
VIT B12 SERPL-MCNC: 351 PG/ML (ref 232–1245)
VLDLC SERPL CALC-MCNC: 15 MG/DL (ref 5–40)
WBC # BLD AUTO: 7.5 X10E3/UL (ref 3.4–10.8)

## 2025-02-21 ENCOUNTER — OFFICE VISIT (OUTPATIENT)
Age: 50
End: 2025-02-21

## 2025-02-21 DIAGNOSIS — E66.01 MORBID OBESITY: Primary | ICD-10-CM

## 2025-02-21 LAB — VIT B1 BLD-SCNC: 88.6 NMOL/L (ref 66.5–200)

## 2025-02-21 NOTE — PROGRESS NOTES
Pre-operative Bariatric Nutrition Evaluation    Date: 2025              Session 1 of 6    Insurance:  [facility]            Physician/Surgeon: Dr. Erasmo Yeager    Vandana Jeong was evaluated through a synchronous (real-time) audio encounter. Patient identification was verified at the start of the visit.    The patient was located at Home: [address] [facility] [address].  The provider was located in [place], [place].  Confirm you are appropriately licensed, registered, or certified to deliver care in the state where the patient is located as indicated above. If you are not or unsure, please re-schedule the visit: Yes, I confirm.     Name: Vandana Jeong  :  1975  Age:  49  Gender: Female  Type of Surgery: [x]   Gastric Bypass   []    Sleeve Gastrectomy    ASSESSMENT:    Past Medical History: lapband     Medications/Supplements:   Prior to Admission medications    Medication Sig Start Date End Date Taking? Authorizing Provider   DULoxetine (CYMBALTA) 30 MG extended release capsule ceived the following from [facility]: Outside name: CYMBALTA 30 mg capsule 10/16/13   Automatic Reconciliation, Ar   ferrous sulfate (IRON 325) 325 (65 Fe) MG tablet Take 325 mg by mouth 3 times daily  Patient not taking: Reported on 2025   Automatic Reconciliation, Ar   phentermine (ADIPEX-P) 37.5 MG tablet Take 1 tablet by mouth daily. 22   Automatic Reconciliation, Ar     Smoking: None  Alcohol: None    Food Allergies/Intolerances: None    Anthropometrics:    Ht: 65 inches  Wt:  327 lbs    IBW: 125 lbs    %IBW: 261     BMI: 54  Category: Obesity class III    Reported wt history: Pt presents today for pre-op nutrition evaluation for wt loss surgery. Reports lowest adult BW of 220 lbs and highest adult BW of 327 lbs. Attributes wt gain over the years r/t stress (taking care of parents) . Has attempted wt loss through various methods with most successful wt loss of 50 lbs. Has

## 2025-02-24 ENCOUNTER — TELEPHONE (OUTPATIENT)
Age: 50
End: 2025-02-24

## 2025-02-24 NOTE — TELEPHONE ENCOUNTER
Attempted to contact pt to schedule In Person RASHAWN puckett with Rosaura MOMIN for pt to contact our office to schedule.

## 2025-03-03 RX ORDER — ERGOCALCIFEROL 1.25 MG/1
50000 CAPSULE, LIQUID FILLED ORAL WEEKLY
Qty: 12 CAPSULE | Refills: 1 | Status: SHIPPED | OUTPATIENT
Start: 2025-03-03

## 2025-03-09 SDOH — HEALTH STABILITY: PHYSICAL HEALTH: ON AVERAGE, HOW MANY DAYS PER WEEK DO YOU ENGAGE IN MODERATE TO STRENUOUS EXERCISE (LIKE A BRISK WALK)?: 0 DAYS

## 2025-03-12 ENCOUNTER — OFFICE VISIT (OUTPATIENT)
Age: 50
End: 2025-03-12
Payer: COMMERCIAL

## 2025-03-12 VITALS
DIASTOLIC BLOOD PRESSURE: 88 MMHG | WEIGHT: 293 LBS | OXYGEN SATURATION: 92 % | RESPIRATION RATE: 18 BRPM | HEART RATE: 88 BPM | SYSTOLIC BLOOD PRESSURE: 124 MMHG | BODY MASS INDEX: 48.82 KG/M2 | HEIGHT: 65 IN | TEMPERATURE: 98.6 F

## 2025-03-12 DIAGNOSIS — R79.89 ABNORMAL TSH: ICD-10-CM

## 2025-03-12 DIAGNOSIS — R53.83 OTHER FATIGUE: ICD-10-CM

## 2025-03-12 DIAGNOSIS — E55.9 VITAMIN D DEFICIENCY: ICD-10-CM

## 2025-03-12 DIAGNOSIS — E78.5 HYPERLIPIDEMIA, UNSPECIFIED HYPERLIPIDEMIA TYPE: ICD-10-CM

## 2025-03-12 DIAGNOSIS — D50.9 IRON DEFICIENCY ANEMIA, UNSPECIFIED IRON DEFICIENCY ANEMIA TYPE: ICD-10-CM

## 2025-03-12 DIAGNOSIS — Z12.11 SCREENING FOR MALIGNANT NEOPLASM OF COLON: ICD-10-CM

## 2025-03-12 DIAGNOSIS — Z76.89 ENCOUNTER TO ESTABLISH CARE: Primary | ICD-10-CM

## 2025-03-12 DIAGNOSIS — M54.9 UPPER BACK PAIN: ICD-10-CM

## 2025-03-12 PROCEDURE — 99204 OFFICE O/P NEW MOD 45 MIN: CPT | Performed by: CLINICAL NURSE SPECIALIST

## 2025-03-12 RX ORDER — FERROUS SULFATE 325(65) MG
TABLET, DELAYED RELEASE (ENTERIC COATED) ORAL
Qty: 90 TABLET | Refills: 1 | Status: SHIPPED | OUTPATIENT
Start: 2025-03-12

## 2025-03-12 SDOH — ECONOMIC STABILITY: FOOD INSECURITY: WITHIN THE PAST 12 MONTHS, YOU WORRIED THAT YOUR FOOD WOULD RUN OUT BEFORE YOU GOT MONEY TO BUY MORE.: NEVER TRUE

## 2025-03-12 SDOH — ECONOMIC STABILITY: FOOD INSECURITY: WITHIN THE PAST 12 MONTHS, THE FOOD YOU BOUGHT JUST DIDN'T LAST AND YOU DIDN'T HAVE MONEY TO GET MORE.: NEVER TRUE

## 2025-03-12 ASSESSMENT — ENCOUNTER SYMPTOMS: SHORTNESS OF BREATH: 0

## 2025-03-12 NOTE — PROGRESS NOTES
Chief Complaint   Patient presents with    New Patient    Establish Care     Pt states she has establish care with Dr Yeager with General surgery for possible surgery for weight loss. Pt reports he is concerned about her cholesterol, anemia, and thyroid levels.     Pt states she has been experiencing severe back pain that may be coming from her breast size. She states she is starting to develop sores from her bras.     \"Have you been to the ER, urgent care clinic since your last visit?  Hospitalized since your last visit?\"    NO    “Have you seen or consulted any other health care providers outside our system since your last visit?”    NO    Have you had a mammogram?”   YES - Where: [facility] Nurse/CMA to request most recent records if not in the chart    No breast cancer screening on file      “Have you had a pap smear?”    YES - Where: DR gaspar  Nurse/CMA to request most recent records if not in the chart    No cervical cancer screening on file       “Have you had a colorectal cancer screening such as a colonoscopy/FIT/Cologuard?    NO    No colonoscopy on file  No cologuard on file  No FIT/FOBT on file   No flexible sigmoidoscopy on file

## 2025-03-12 NOTE — PROGRESS NOTES
Vandana Jeong (:  1975) is a 49 y.o. female,New patient, here for evaluation of the following chief complaint(s):  New Patient and Establish Care         Assessment & Plan  Encounter to establish care    Reviewed all histories as well as age and gender appropriate preventative health.     Abnormal TSH    Will obtain T4 for further surveillance. If unremarkable, will likely still start low dose levothyroxine given fatigue.     Orders:    T4; Future    Thyroid Cascade Profile; Future    Iron deficiency anemia, unspecified iron deficiency anemia type    Discussed indication, use, and potential side effects of ferrous. Repeat CBC and iron profile in 3 months.     Orders:    CBC with Auto Differential; Future    Iron and TIBC; Future    Other fatigue    Likely related to anemia and hypothyroidism. Will reassess in 12 weeks. She will advise of any new or worsening symptoms in the interim.       Upper back pain    Weight loss surgery should assist with large breast, breast reduction likely not necessary. Continue with OTC analgesics PRN. Encouraged intermittent heat application as tolerated.       Screening for malignant neoplasm of colon    Advised on current screening guidelines.     Orders:    Ashley Childress MD, [facility] ([facility] [address])    Hyperlipidemia, unspecified hyperlipidemia type    Reviewed ASCVD risk calculator for risk stratification. Educated on a heart healthy diet and minimum of 30 minutes of aerobic exercise daily. Repeat lipid panel in 3 months.     Orders:    Lipid Panel; Future    Vitamin D deficiency    Continue supplementation; surveillance in 3 months.     Orders:    Vitamin D 25 Hydroxy; Future      Will follow up pending test results and discuss any additional plan of care.   Return in about 3 months (around 2025).       Subjective   Ms. Jeong presents to establish care. States that she is generally doing alright. Recently established with bariatric

## 2025-03-12 NOTE — ASSESSMENT & PLAN NOTE
Discussed indication, use, and potential side effects of ferrous. Repeat CBC and iron profile in 3 months.     Orders:    CBC with Auto Differential; Future    Iron and TIBC; Future

## 2025-03-13 LAB — T4 SERPL-MCNC: 7 UG/DL (ref 4.5–12)

## 2025-03-14 ENCOUNTER — RESULTS FOLLOW-UP (OUTPATIENT)
Age: 50
End: 2025-03-14

## 2025-03-14 DIAGNOSIS — E03.8 SUBCLINICAL HYPOTHYROIDISM: Primary | ICD-10-CM

## 2025-03-14 RX ORDER — LEVOTHYROXINE SODIUM 25 UG/1
25 TABLET ORAL DAILY
Qty: 90 TABLET | Refills: 0 | Status: SHIPPED | OUTPATIENT
Start: 2025-03-14

## 2025-03-17 NOTE — TELEPHONE ENCOUNTER
Vandana Jeong was called and verbalized understanding on note below.     ----- Message from BLANKA Perea CNP sent at 3/14/2025  1:16 PM EDT -----  Please advise that her T4 is normal which indicates that she likely has subclinical hypothyroidism which basically means a very mild case of underfunctioning thyroid.  Will do a trial of thyroid replacement.  Was originally done a repeat thyroid function studies in 3 months and other labs. She should make sure that she takes the levothyroxine without any other medications without food for at least 1 hour.

## 2025-03-20 ENCOUNTER — OFFICE VISIT (OUTPATIENT)
Age: 50
End: 2025-03-20

## 2025-03-20 DIAGNOSIS — E66.01 MORBID OBESITY: Primary | ICD-10-CM

## 2025-03-20 NOTE — PROGRESS NOTES
[facility]  Supervised Weight Loss     Date:   3/20/2025    Patient's Name: Vandana Jeong  : 1975    Insurance:  [employer]          Session: 2 of  6  Surgery: Gastric bypass  Surgeon:  Dr. Erasmo Yeager    Height: 65 inches Weight:    327      Lbs.   BMI: 54   Pounds Lost since last month: 0               Pounds Gained since last month: 0    Starting Weight: 327 lbs   Previous Month’s Weight: 327 lbs  Overall Pounds Lost: 0 Overall Pounds Gained: 0    Other Pertinent Information: Today's appointment was completed in a virtual setting. Surgeon recommending 16 lb weight loss before surgery.    Smoking Status:  None  Alcohol Intake: None    I have reviewed with pt the guidelines of the supervised wt loss program.  Pt understands the expectations of some wt loss during the program and that wt gain could delay the process. I have also explained that appointments need to be consecutive and missing an appointment may result in starting over. Pt has received this information in writing.          Changes that patient has made since last month include:  cut back on sugary drinks, drinking more water.      Eating Habits and Behaviors  General healthy eating guidelines were discussed. A nutrition lesson was presented on portion control. Patients were instructed implement portion control now using the balanced plate method (1/2 plate non-starchy vegetables, 1/4 plate lean meat, and 1/4 plate whole grains and to include fruit and/or milk at meals or snack). We discussed measuring meals to 1/2 cup total per meal after surgery and appropriate portion progression long term.                       Patient's current diet habits include: Pt is eating 1-2 meals per day. Snack choices include chips, popcorn. Pt is eating refined carbohydrate foods (bread, pasta, rice, potatoes) 3x week. Pt is eating sweets/desserts 5x week. Pt is using healthy cooking methods. Pt is eating meals

## 2025-03-21 ENCOUNTER — TELEPHONE (OUTPATIENT)
Age: 50
End: 2025-03-21

## 2025-03-24 ENCOUNTER — TELEPHONE (OUTPATIENT)
Age: 50
End: 2025-03-24

## 2025-03-24 NOTE — TELEPHONE ENCOUNTER
Returning patients call to reschedule her EGD with Dr Yeager that was scheduled for 3/24/25.    LM for patient to all me back to discuss a new date and time.  I left my direct phone number for her to call

## 2025-03-25 ENCOUNTER — PREP FOR PROCEDURE (OUTPATIENT)
Age: 50
End: 2025-03-25

## 2025-03-25 ENCOUNTER — TELEPHONE (OUTPATIENT)
Age: 50
End: 2025-03-25

## 2025-03-25 NOTE — TELEPHONE ENCOUNTER
Patient was scheduled for endoscopy on 3/24 and it was canceled.  Calling patient to see if she wants to reschedule procedure     Spoke to patient to re-schedule her EGD with Dr Yeager at [facility].  I offered 4/7 or 4/28 @ 9am with arrival time of 7:30am and she accepted 4/28    I let the patient know they are required to bring someone with them that will be responsible to take them home along with their photo ID and insurance card.      If you are taking any weight lose injectable medication, you need to stop one week before procedure: she said she had to cancel due to she took her medication so we discussed that she should take it on 4/18 or 4/25 and resume after procedure                             Trulicity (dulaglutide)                          Wegovy (Semaglutide)                          Ozempic (Semaglutide)                          Rybelsus (tirzepatide)                          Mounjaro (tirzepatide)                           Zepbound (tirzepatide)        I let them know once this is scheduled I will put the information in a letter along with where they need to go and pre-procedure instructions.   This letter will post to their my chart and go out in the mail.  She acknowledged thank you

## 2025-03-28 ENCOUNTER — PATIENT MESSAGE (OUTPATIENT)
Age: 50
End: 2025-03-28

## 2025-03-28 DIAGNOSIS — R63.5 ABNORMAL WEIGHT GAIN: Primary | ICD-10-CM

## 2025-03-28 RX ORDER — PHENTERMINE HYDROCHLORIDE 37.5 MG/1
37.5 TABLET ORAL DAILY
Qty: 30 TABLET | Refills: 2 | Status: SHIPPED | OUTPATIENT
Start: 2025-03-28 | End: 2025-06-26

## 2025-03-28 NOTE — TELEPHONE ENCOUNTER
Last appt 3/12/2025      Next Apt:     Future Appointments   Date Time Provider Department Center   4/16/2025 11:00 AM Denita Howell RD [facility] AMB   4/21/2025  1:00 PM Mahnaz Jenkins LPC [facility] AMB   6/16/2025  1:00 PM Aisha Cross APRN - CNP [facility] [address] [place] [address] [phone] - F [phone]  [address] [place] [address]  Phone: [phone] Fax: [phone]

## 2025-04-08 ENCOUNTER — PATIENT MESSAGE (OUTPATIENT)
Age: 50
End: 2025-04-08

## 2025-04-08 DIAGNOSIS — F33.9 RECURRENT MAJOR DEPRESSIVE DISORDER, REMISSION STATUS UNSPECIFIED: Primary | ICD-10-CM

## 2025-04-09 RX ORDER — DULOXETIN HYDROCHLORIDE 30 MG/1
30 CAPSULE, DELAYED RELEASE ORAL DAILY
Qty: 90 CAPSULE | Refills: 1 | Status: CANCELLED | OUTPATIENT
Start: 2025-04-09

## 2025-04-09 NOTE — TELEPHONE ENCOUNTER
Last appt 3/12/2025      Next Apt:     Future Appointments   Date Time Provider Department Center   4/16/2025 11:00 AM Denita Howell RD [facility] AMB   4/21/2025  1:00 PM Mahnaz Jenkins LPC [facility] AMB   6/16/2025  1:00 PM Aisha Cross, BLANKA - CNP [facility]         [id number] - [place], [place] - [address] [place] [address] -  [phone] - F [phone]  [address] [place] [address]  Phone: [phone] Fax: [phone]

## 2025-04-16 ENCOUNTER — OFFICE VISIT (OUTPATIENT)
Age: 50
End: 2025-04-16

## 2025-04-16 DIAGNOSIS — E66.01 MORBID OBESITY (HCC): Primary | ICD-10-CM

## 2025-04-23 NOTE — PROGRESS NOTES
[facility]  Supervised Weight Loss     Date:   2025    Patient's Name: Vandana Jeong  : 1975    Insurance:  [employer]          Session: 3   6  Surgery: Gastric Bypass                  Surgeon:  Erasmo Yeager M.D.      Height: 65\"          Reported Weight:    320      Lbs.                                   BMI: 53             Pounds Lost since last month: 7#               Pounds Gained since last month: 0     Starting Weight: 327#                  Previous Month’s Weight: 327#  Overall Pounds Lost: 7#         Overall Pounds Gained: 0     Other Pertinent Information: Today's appointment was completed in a virtual class setting. Surgeon recommending 16 lb weight loss before surgery.    Smoking Status:  none  Alcohol Intake: none    I have reviewed with pt the guidelines of the supervised wt loss program.  Pt understands the expectations of some wt loss during the program and that wt gain could delay the process. I have also explained that appointments need to be consecutive and missing an appointment may result in starting over. Pt has received this information in writing. This appointment was complimentary/non-billable as part of the bariatric surgery program.         Changes that patient has made since last month include:  increased water intake, monitoring protein intake.      Eating Habits and Behaviors  General healthy eating guidelines were also discussed. Pts were instructed that their plate should be made up 1/2 plate coming from non-starchy vegetables, 1/4 coming from lean meat, and 1/4 of their plate coming from carbohydrates, including fruits, starches, or milk. We discussed measuring meals to 1/2 cup total per meal after surgery. Drinking only calorie-free, sugar-free and non-carbonated beverages. We discussed the importance of drinking 64 ounces of fluid per day to prevent dehydration post-operatively.                       Patient's

## 2025-04-28 ENCOUNTER — ANESTHESIA (OUTPATIENT)
Facility: HOSPITAL | Age: 50
End: 2025-04-28
Payer: COMMERCIAL

## 2025-04-28 ENCOUNTER — ANESTHESIA EVENT (OUTPATIENT)
Facility: HOSPITAL | Age: 50
End: 2025-04-28
Payer: COMMERCIAL

## 2025-04-28 ENCOUNTER — HOSPITAL ENCOUNTER (OUTPATIENT)
Facility: HOSPITAL | Age: 50
Setting detail: OUTPATIENT SURGERY
Discharge: HOME | End: 2025-04-28
Attending: SURGERY | Admitting: SURGERY
Payer: COMMERCIAL

## 2025-04-28 VITALS
RESPIRATION RATE: 25 BRPM | OXYGEN SATURATION: 97 % | DIASTOLIC BLOOD PRESSURE: 89 MMHG | TEMPERATURE: 97.7 F | HEART RATE: 70 BPM | SYSTOLIC BLOOD PRESSURE: 152 MMHG

## 2025-04-28 LAB — HELIOBACTER PYLORI ID: NEGATIVE

## 2025-04-28 PROCEDURE — 43239 EGD BIOPSY SINGLE/MULTIPLE: CPT | Performed by: SURGERY

## 2025-04-28 PROCEDURE — 2500000003 HC RX 250 WO HCPCS: Performed by: NURSE ANESTHETIST, CERTIFIED REGISTERED

## 2025-04-28 PROCEDURE — 6360000002 HC RX W HCPCS: Performed by: NURSE ANESTHETIST, CERTIFIED REGISTERED

## 2025-04-28 PROCEDURE — 7100000011 HC PHASE II RECOVERY - ADDTL 15 MIN: Performed by: SURGERY

## 2025-04-28 PROCEDURE — 88305 TISSUE EXAM BY PATHOLOGIST: CPT

## 2025-04-28 PROCEDURE — 7100000010 HC PHASE II RECOVERY - FIRST 15 MIN: Performed by: SURGERY

## 2025-04-28 PROCEDURE — 3700000000 HC ANESTHESIA ATTENDED CARE: Performed by: SURGERY

## 2025-04-28 PROCEDURE — 3600007502: Performed by: SURGERY

## 2025-04-28 PROCEDURE — 3700000001 HC ADD 15 MINUTES (ANESTHESIA): Performed by: SURGERY

## 2025-04-28 PROCEDURE — 2709999900 HC NON-CHARGEABLE SUPPLY: Performed by: SURGERY

## 2025-04-28 PROCEDURE — 3600007512: Performed by: SURGERY

## 2025-04-28 RX ORDER — LIDOCAINE HYDROCHLORIDE 20 MG/ML
INJECTION, SOLUTION EPIDURAL; INFILTRATION; INTRACAUDAL; PERINEURAL
Status: DISCONTINUED | OUTPATIENT
Start: 2025-04-28 | End: 2025-04-28 | Stop reason: SDUPTHER

## 2025-04-28 RX ORDER — DEXMEDETOMIDINE HYDROCHLORIDE 100 UG/ML
INJECTION, SOLUTION INTRAVENOUS
Status: DISCONTINUED | OUTPATIENT
Start: 2025-04-28 | End: 2025-04-28 | Stop reason: SDUPTHER

## 2025-04-28 RX ORDER — OMEPRAZOLE 40 MG/1
40 CAPSULE, DELAYED RELEASE ORAL
Qty: 90 CAPSULE | Refills: 3 | Status: SHIPPED | OUTPATIENT
Start: 2025-04-28

## 2025-04-28 RX ORDER — SODIUM CHLORIDE 0.9 % (FLUSH) 0.9 %
5-40 SYRINGE (ML) INJECTION PRN
Status: DISCONTINUED | OUTPATIENT
Start: 2025-04-28 | End: 2025-04-28 | Stop reason: HOSPADM

## 2025-04-28 RX ORDER — SODIUM CHLORIDE 9 MG/ML
INJECTION, SOLUTION INTRAVENOUS PRN
Status: DISCONTINUED | OUTPATIENT
Start: 2025-04-28 | End: 2025-04-28 | Stop reason: HOSPADM

## 2025-04-28 RX ORDER — SODIUM CHLORIDE 0.9 % (FLUSH) 0.9 %
5-40 SYRINGE (ML) INJECTION EVERY 12 HOURS SCHEDULED
Status: DISCONTINUED | OUTPATIENT
Start: 2025-04-28 | End: 2025-04-28 | Stop reason: HOSPADM

## 2025-04-28 RX ORDER — PROPOFOL 10 MG/ML
INJECTION, EMULSION INTRAVENOUS
Status: DISCONTINUED | OUTPATIENT
Start: 2025-04-28 | End: 2025-04-28 | Stop reason: SDUPTHER

## 2025-04-28 RX ADMIN — PROPOFOL 500 MCG/KG/MIN: 10 INJECTION, EMULSION INTRAVENOUS at 08:25

## 2025-04-28 RX ADMIN — LIDOCAINE HYDROCHLORIDE 100 MG: 20 INJECTION, SOLUTION EPIDURAL; INFILTRATION; INTRACAUDAL at 08:25

## 2025-04-28 RX ADMIN — DEXMEDETOMIDINE 10 MCG: 100 INJECTION, SOLUTION INTRAVENOUS at 08:25

## 2025-04-28 ASSESSMENT — PAIN SCALES - GENERAL: PAINLEVEL_OUTOF10: 0

## 2025-04-28 NOTE — OP NOTE
[facility]   Endoscopic Procedure Note        NAME:  Vandana Jeong   :   1975   MRN:   [id number]     Date/Time:  2025 8:38 AM    Esophagogastroduodenoscopy (EGD) Procedure Note    Preoperative Diagnosis: Morbid obesity [E66.01]  Postoperative Diagnosis: Post-Op Diagnosis Codes:     * Morbid obesity (HCC) [E66.01]  Hiatal hernia       Surgeon:  Erasmo Yeager MD    Staff: Circulator: Avelina Garcia RN  Endoscopy Technician: Greg Gage     Implants: None    Anethesia/Sedation:  MAC anesthesia Propofol    Procedure Details     After infom consent was obtained for the procedure, with all risks and benefits of procedure explained the patient was taken to the endoscopy suite and placed in the left lateral decubitus position.  Following sequential administration of sedation as per above, the GIF-H190 gastroscope was inserted into the mouth and advanced under direct vision to duodenal bulb.  A careful inspection was made as the gastroscope was withdrawn, including a retroflexed view of the proximal stomach; findings and interventions are described below.      Findings:  Esophagus: Distal esophagitis with exudate, GEJ at 34 cm, 5 cm hiatal hernia  Stomach: Gastric band in place without erosion, Hill Grade 3 valve  Duodenum/jejunum: Normal       Therapies: none    Specimens: cold bx antrum for H pylori, cold bx esophagus           EBL: Minimal    Complications:   None; patient tolerated the procedure well.           Impression:    See Postoperative diagnosis above    Recommendations:  Start PPI therapy daily    Discharge disposition:  Home in the company of  when able to ambulate    Erasmo Yeager MD, [facility], [facility]  Bariatric and General Surgeon  [facility]

## 2025-04-28 NOTE — H&P
Worried About Running Out of Food in the Last Year: Never true     Ran Out of Food in the Last Year: Never true   Transportation Needs: No Transportation Needs (3/12/2025)    PRAPARE - Transportation     Lack of Transportation (Medical): No     Lack of Transportation (Non-Medical): No   Physical Activity: Unknown (3/9/2025)    Exercise Vital Sign     Days of Exercise per Week: 0 days   Housing Stability: Low Risk  (3/12/2025)    Housing Stability Vital Sign     Unable to Pay for Housing in the Last Year: No     Number of Times Moved in the Last Year: 0     Homeless in the Last Year: No      Prior to Admission medications    Medication Sig Start Date End Date Taking? Authorizing Provider   phentermine (ADIPEX-P) 37.5 MG tablet Take 1 tablet by mouth daily for 90 days. Max Daily Amount: 37.5 mg 3/28/25 6/26/25  Aisha Cross APRN - CNP   levothyroxine (SYNTHROID) 25 MCG tablet Take 1 tablet by mouth daily 3/14/25   Aisha Cross APRN - CNP   ferrous sulfate (FE TABS 325) 325 (65 Fe) MG EC tablet Take 1 tablet by mouth every other day. 3/12/25   Aisha Cross APRN - CNP   vitamin D (ERGOCALCIFEROL) 1.25 MG (61289 UT) CAPS capsule Take 1 capsule by mouth once a week 3/3/25   Erasmo Yeager MD   DULoxetine (CYMBALTA) 30 MG extended release capsule ceived the following from [facility]: Outside name: CYMBALTA 30 mg capsule 10/16/13   Automatic Reconciliation, Ar     Allergies   Allergen Reactions    Amoxicillin Hives       Review of Systems:  Constitutional: No fever or chills  Neurologic: No headache  Eyes: No scleral icterus or irritated eyes  Nose: No nasal pain or drainage  Mouth: No oral lesions or sore throat  Cardiac: No palpations or chest pain  Pulmonary: No cough or shortness of breath  Gastrointestinal: No nausea, emesis, diarrhea, or constipation  Genitourinary: No dysuria  Musculoskeletal: No muscle or joint tenderness  Skin: No rashes or lesions  Psychiatric: No anxiety or

## 2025-04-28 NOTE — ANESTHESIA PRE PROCEDURE
Department of Anesthesiology  Preprocedure Note       Name:  Vandana Jeong   Age:  49 y.o.  :  1975                                          MRN:  [id number]         Date:  2025      Surgeon: Surgeon(s):  Erasmo Yeager MD    Procedure: Procedure(s):  ESOPHAGOGASTRODUODENOSCOPY    Medications prior to admission:   Prior to Admission medications    Medication Sig Start Date End Date Taking? Authorizing Provider   phentermine (ADIPEX-P) 37.5 MG tablet Take 1 tablet by mouth daily for 90 days. Max Daily Amount: 37.5 mg 3/28/25 6/26/25  Aisha Cross APRN - CNP   levothyroxine (SYNTHROID) 25 MCG tablet Take 1 tablet by mouth daily 3/14/25   Aisha Cross APRN - CNP   ferrous sulfate (FE TABS 325) 325 (65 Fe) MG EC tablet Take 1 tablet by mouth every other day. 3/12/25   Aisha Cross APRN - CNP   vitamin D (ERGOCALCIFEROL) 1.25 MG (89167 UT) CAPS capsule Take 1 capsule by mouth once a week 3/3/25   Erasmo Yeager MD   DULoxetine (CYMBALTA) 30 MG extended release capsule ceived the following from [facility]: Outside name: CYMBALTA 30 mg capsule 10/16/13   Automatic Reconciliation, Ar       Current medications:    No current facility-administered medications for this encounter.       Allergies:    Allergies   Allergen Reactions   • Amoxicillin Hives       Problem List:    Patient Active Problem List   Diagnosis Code   • Encounter for adjustment of gastric lap band Z46.51   • Obesities, morbid (HCC) E66.01   • Anemia D64.9   • Abnormal weight gain R63.5   • Major depressive disorder F32.9   • Personal history of diseases of blood and blood-forming organs Z86.2   • Personal history of mental disorder Z86.59   • High cholesterol E78.00   • Morbid obesity (HCC) E66.01       Past Medical History:        Diagnosis Date   • Abnormal weight gain 2022   • Anemia 2022   • Edema leg     juan LE   • High cholesterol 2022   • Hypertension    • Major depressive disorder

## 2025-04-28 NOTE — ANESTHESIA POSTPROCEDURE EVALUATION
Addended by: JOHAN LEIJA on: 9/20/2019 09:35 AM     Modules accepted: Orders     Department of Anesthesiology  Postprocedure Note    Patient: Vandana Jeong  MRN: [id number]  YOB: 1975  Date of evaluation: 4/28/2025    Procedure Summary       Date: 04/28/25 Room / Location: [facility] 03 / [facility] ENDOSCOPY    Anesthesia Start: 0822 Anesthesia Stop: 0836    Procedures:       ESOPHAGOGASTRODUODENOSCOPY (Upper GI Region)      ESOPHAGOGASTRODUODENOSCOPY BIOPSY (Upper GI Region) Diagnosis:       Morbid obesity (HCC)      (Morbid obesity [E66.01])    Surgeons: Erasmo Yeager MD Responsible Provider: Lam Hoffmann MD    Anesthesia Type: MAC ASA Status: 3            Anesthesia Type: No value filed.    Leoncio Phase I: Leoncio Score: 10    Leoncio Phase II: Leoncio Score: 10    Anesthesia Post Evaluation    Patient location during evaluation: PACU  Patient participation: complete - patient participated  Level of consciousness: awake and alert  Pain score: 0  Airway patency: patent  Nausea & Vomiting: no nausea and no vomiting  Cardiovascular status: blood pressure returned to baseline  Respiratory status: acceptable  Hydration status: euvolemic  Pain management: satisfactory to patient    No notable events documented.

## 2025-04-28 NOTE — DISCHARGE INSTRUCTIONS
Discharge Instructions for Endoscopy Patients       Diagnosis: Hiatal hernia, band in place       Plan: Start PPI therapy      Do not drive or operate machinery while taking sedating or narcotic medications.     Some discomfort is expected in your throat or upper abdomen. Take tylenol as needed.    You may walk as desired and go up and down stairs as needed. Walking is encouraged.    You may shower like normal    You may resume regular diet.    Follow up with provider as scheduled.    If you experience fever (greater than 101.5), chills, vomiting or redness or drainage at surgical site, please contact your surgeon’s office.    If you have further questions or concerns, please call your surgeon’s office at [phone].

## 2025-05-14 ENCOUNTER — OFFICE VISIT (OUTPATIENT)
Age: 50
End: 2025-05-14

## 2025-05-14 DIAGNOSIS — E66.01 MORBID OBESITY (HCC): Primary | ICD-10-CM

## 2025-05-22 NOTE — PROGRESS NOTES
[facility]  Supervised Weight Loss     Date:   2025    Patient's Name: Vandana Jeong  : 1975    Insurance:  [employer]          Session:   6  Surgery: Gastric Bypass                  Surgeon:  Erasmo Yeager M.D.      Height: 65\"                        Previous Month's Reported Weight:    320      Lbs.                                   BMI: 53             Pounds Lost since last month: 0#               Pounds Gained since last month: 0     Starting Weight: 327#                  Previous Month’s Weight: 320#  Overall Pounds Lost: 7#              Overall Pounds Gained: 0     Other Pertinent Information: Today's appointment was completed in a virtual class setting. Surgeon recommending 16 lb weight loss before surgery. Pt did not provide an updated/reported wt for today's session.     Smoking Status:  not reported  Alcohol Intake: not reported    I have reviewed with pt the guidelines of the supervised wt loss program.  Pt understands the expectations of some wt loss during the program and that wt gain could delay the process. I have also explained that appointments need to be consecutive and missing an appointment may result in starting over. Pt has received this information in writing. This appointment was complimentary/non-billable as part of the bariatric surgery program.         Changes that patient has made since last month include:  Diet and lifestyle forms were not completed/returned by the patient, therefore, no lifestyle changes reported.      Eating Habits and Behaviors  A nutrition lesson was presented on label reading with specific guidelines provided for limiting added sugars. This information will help increase healthy food choices, promote weight loss and prevent dumping syndrome after gastric bypass. We also reviewed the general nutrition guidelines for bariatric surgery.                        Patient's current diet habits include:

## 2025-06-05 DIAGNOSIS — E03.8 SUBCLINICAL HYPOTHYROIDISM: ICD-10-CM

## 2025-06-05 RX ORDER — LEVOTHYROXINE SODIUM 25 UG/1
25 TABLET ORAL DAILY
Qty: 90 TABLET | Refills: 0 | Status: SHIPPED | OUTPATIENT
Start: 2025-06-05

## 2025-06-05 NOTE — TELEPHONE ENCOUNTER
Last appt 3/12/2025      Next Apt:     Future Appointments   Date Time Provider Department Center   6/11/2025 11:00 AM Denita Howell RD [facility] BS AMB   6/16/2025  1:00 PM Aisha Cross APRN - CNP [facility]   7/21/2025  9:00 AM Mahnaz Jenkins LPC [facility] AMB         [facility]/pharmacy #[id number] - [place], [place] - [address] [place] [address] -  [phone] - F [phone]  [address] [place] [address]  Phone: [phone] Fax: [phone]

## 2025-06-12 ENCOUNTER — OFFICE VISIT (OUTPATIENT)
Age: 50
End: 2025-06-12

## 2025-06-12 DIAGNOSIS — E66.01 MORBID OBESITY (HCC): Primary | ICD-10-CM

## 2025-06-12 NOTE — PROGRESS NOTES
[facility]  Supervised Weight Loss     Date:   2025    Patient's Name: Vandana Jeong  : 1975    nsurance:  [employer]          Session: 5   6  Surgery: Gastric Bypass                  Surgeon:  Erasmo Yeager M.D.      Height: 65\"                        Reported Weight:  315    Lbs.                                   BMI: 53             Pounds Lost since last month: 7#               Pounds Gained since last month: 0     Starting Weight: 327#                  Previous Month’s Weight: 320#  Overall Pounds Lost: 12#              Overall Pounds Gained: 0     Other Pertinent Information: Today's appointment was completed in a virtual class setting. Surgeon recommending 16 lb weight loss before surgery.      Smoking Status:  None  Alcohol Intake: None    I have reviewed with pt the guidelines of the supervised wt loss program.  Pt understands the expectations of some wt loss during the program and that wt gain could delay the process. I have also explained that classes need to be consecutive.  Missing a class may result in starting over. Pt has received this information in writing.          Changes that patient has made since last month include: increased water intake, monitoring protein intake.      Eating Habits and Behaviors  General healthy eating guidelines were discussed. A nutrition lesson specific to the importance of protein intake after surgery was provided. We discussed food sources of protein, protein supplements and multiple reasons as to why protein is important after bariatric surgery.  Pts were instructed to focus on including protein at every meal and practice eating protein first at the meal. Pts were encouraged to sample a protein shake for tolerance. Patients were also instructed to use the balanced plate method for help with portion control and general healthy eating prior to surgery. We discussed measuring meals to 1/2 cup total per

## 2025-07-01 RX ORDER — DULOXETIN HYDROCHLORIDE 30 MG/1
30 CAPSULE, DELAYED RELEASE ORAL DAILY
Qty: 30 CAPSULE | Refills: 5 | Status: SHIPPED | OUTPATIENT
Start: 2025-07-01

## 2025-07-08 ENCOUNTER — OFFICE VISIT (OUTPATIENT)
Age: 50
End: 2025-07-08

## 2025-07-08 DIAGNOSIS — E66.01 MORBID OBESITY (HCC): Primary | ICD-10-CM

## 2025-07-08 NOTE — PROGRESS NOTES
[facility]  Supervised Weight Loss     Date:   2025    Patient's Name: Vandana Jeong  : 1975    Insurance:  [place] [employer]          Session:   Surgery: Gastric Bypass                  Surgeon:  Erasmo Yeager M.D.      Height: 65\"                     Last months Weight:  315   Lbs.                                   BMI: 53             Pounds Lost since last month: 0               Pounds Gained since last month: 0     Starting Weight: 327#                  Previous Month’s Weight: 315#  Overall Pounds Lost: 12#              Overall Pounds Gained: 0     Other Pertinent Information: Today's appointment was completed in a virtual class setting. Surgeon recommending 16 lb weight loss before surgery.      Smoking Status:  Not reported  Alcohol Intake: Not reported       I have reviewed with pt the guidelines of the supervised wt loss program.  Pt understands the expectations of some wt loss during the program and that wt gain could delay the process. I have also explained that appointments need to be consecutive and missing an appointment may result in starting over. Pt has received this information in writing.          Changes that patient has made since last month include: Pt did not complete the diet and lifestyle questionnaire despite reminders, therefore, no lifestyle changes were reported.      Eating Habits and Behaviors  A nutrition lesson specific to vitamins was provided. We discussed the various reasons for needing vitamins and different types and doses. General healthy eating guidelines were also discussed. Pts were instructed that their plate should be made up 1/2 plate coming from non-starchy vegetables, 1/4 coming from lean meat, and 1/4 of their plate coming from carbohydrates, including fruits, starches, or milk.  We discussed measuring meals to 1/2 cup total per meal after surgery. Drinking only calorie-free, sugar-free and non-carbonated

## 2025-07-18 ENCOUNTER — OFFICE VISIT (OUTPATIENT)
Age: 50
End: 2025-07-18
Payer: COMMERCIAL

## 2025-07-18 VITALS
HEIGHT: 65 IN | HEART RATE: 80 BPM | RESPIRATION RATE: 16 BRPM | DIASTOLIC BLOOD PRESSURE: 84 MMHG | BODY MASS INDEX: 48.82 KG/M2 | WEIGHT: 293 LBS | OXYGEN SATURATION: 99 % | TEMPERATURE: 98.2 F | SYSTOLIC BLOOD PRESSURE: 114 MMHG

## 2025-07-18 DIAGNOSIS — R53.83 OTHER FATIGUE: ICD-10-CM

## 2025-07-18 DIAGNOSIS — D50.9 IRON DEFICIENCY ANEMIA, UNSPECIFIED IRON DEFICIENCY ANEMIA TYPE: ICD-10-CM

## 2025-07-18 DIAGNOSIS — E03.9 HYPOTHYROIDISM, UNSPECIFIED TYPE: ICD-10-CM

## 2025-07-18 DIAGNOSIS — E03.9 HYPOTHYROIDISM, UNSPECIFIED TYPE: Primary | ICD-10-CM

## 2025-07-18 DIAGNOSIS — N95.1 PERIMENOPAUSAL VASOMOTOR SYMPTOMS: ICD-10-CM

## 2025-07-18 PROCEDURE — 99213 OFFICE O/P EST LOW 20 MIN: CPT | Performed by: CLINICAL NURSE SPECIALIST

## 2025-07-18 RX ORDER — TIRZEPATIDE 15 MG/.5ML
INJECTION, SOLUTION SUBCUTANEOUS
COMMUNITY
Start: 2025-07-14

## 2025-07-18 ASSESSMENT — ENCOUNTER SYMPTOMS
GASTROINTESTINAL NEGATIVE: 1
RESPIRATORY NEGATIVE: 1

## 2025-07-18 NOTE — PROGRESS NOTES
Chief Complaint   Patient presents with    Follow-up     Pt reports doing overall well.       Have you been to the ER, urgent care clinic since your last visit?  Hospitalized since your last visit?   NO    Have you seen or consulted any other health care providers outside our system since your last visit?   NO    Have you had a mammogram?”   YES - Where: [facility] Nurse/CMA to request most recent records if not in the chart    No breast cancer screening on file      “Have you had a pap smear?”    YES - Where: Dr Estrada  Nurse/CMA to request most recent records if not in the chart    No cervical cancer screening on file       “Have you had a colorectal cancer screening such as a colonoscopy/FIT/Cologuard?    NO    No colonoscopy on file  No cologuard on file  No FIT/FOBT on file   No flexible sigmoidoscopy on file

## 2025-07-21 ENCOUNTER — OFFICE VISIT (OUTPATIENT)
Age: 50
End: 2025-07-21
Payer: COMMERCIAL

## 2025-07-21 DIAGNOSIS — F33.2 SEVERE EPISODE OF RECURRENT MAJOR DEPRESSIVE DISORDER, WITHOUT PSYCHOTIC FEATURES (HCC): Primary | ICD-10-CM

## 2025-07-21 DIAGNOSIS — F43.9 TRAUMA AND STRESSOR-RELATED DISORDER: ICD-10-CM

## 2025-07-21 DIAGNOSIS — E66.01 MORBID OBESITY (HCC): ICD-10-CM

## 2025-07-21 DIAGNOSIS — F43.81 PROLONGED GRIEF DISORDER: ICD-10-CM

## 2025-07-21 PROCEDURE — 90791 PSYCH DIAGNOSTIC EVALUATION: CPT | Performed by: COUNSELOR

## 2025-07-21 NOTE — PROGRESS NOTES
[facility]  Bariatric Psychosocial Evaluation - Part 1 of 2    This note will not be viewable in [employer] for the following reason(s). This is a Psychotherapy Note. (Behavioral Health Providers Only)    Date of Intake:  2025   Start Time:  10:00 AM  End Time:  10:46 AM  CPT code: 36267  Telehealth:  No     Name: Vandana Jeong      :  1975   Gender:  female   Marital Status:Single     Race/Ethnicity:  Black /    Type of Service:  78777 - Psychiatric Diagnostic Evaluation      BARIATRIC PSYCHOSOCIAL EVALUATION STATUS - PENDING PART 2    REASON FOR REFERRAL:    Vandana Jeong is a 50 year-old, single, , female referred by her surgeon, Dr. Erasmo Yeager, to determine her appropriateness for bariatric surgery.  She is 5 feet 5 inches tall and her total weight today is 309 lbs.  The purpose of the evaluation is to assess personality, psychopathology, emotional functioning, and health behavior adherence through clinical interview and psychological testing to identify factors that might provide challenges to optimal recovery for bariatric/metabolic surgery (BMS).    HISTORY OF PRESENTING PROBLEM:    Ms. Jeong reported having struggled with her weight since the age of 10.  The patient stated that her lowest adult weight was 225 lbs. and highest adult weight was 330 lbs.  Patient endorsed the following contributors to her weight problems:  ?  Genetics    ?  Poor Eating Habits   []  Inconsistency  ?  Sedentary Lifestyle []  Injury or Illness   ?  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  Grazing/Frequent Snacking  ?  Night Eating  ?  Binge Eating  Notes (i.e. Frequency, intensity, past history of, etc):    Ms. Jeong expressed that she has struggled

## 2025-07-31 ENCOUNTER — TELEMEDICINE ON DEMAND (OUTPATIENT)
Age: 50
End: 2025-07-31
Payer: COMMERCIAL

## 2025-07-31 DIAGNOSIS — H92.01 OTALGIA OF RIGHT EAR: ICD-10-CM

## 2025-07-31 DIAGNOSIS — J02.9 PHARYNGITIS, UNSPECIFIED ETIOLOGY: Primary | ICD-10-CM

## 2025-07-31 PROCEDURE — 99213 OFFICE O/P EST LOW 20 MIN: CPT | Performed by: NURSE PRACTITIONER

## 2025-07-31 RX ORDER — LIDOCAINE HYDROCHLORIDE 20 MG/ML
15 SOLUTION OROPHARYNGEAL PRN
Qty: 100 ML | Refills: 0 | Status: SHIPPED | OUTPATIENT
Start: 2025-07-31

## 2025-07-31 RX ORDER — FEXOFENADINE HCL AND PSEUDOEPHEDRINE HCL 180; 240 MG/1; MG/1
1 TABLET, EXTENDED RELEASE ORAL DAILY
Qty: 30 TABLET | Refills: 0 | Status: SHIPPED | OUTPATIENT
Start: 2025-07-31

## 2025-07-31 RX ORDER — FLUTICASONE PROPIONATE 50 MCG
1 SPRAY, SUSPENSION (ML) NASAL DAILY
Qty: 16 G | Refills: 0 | Status: SHIPPED | OUTPATIENT
Start: 2025-07-31

## 2025-07-31 ASSESSMENT — ENCOUNTER SYMPTOMS: SORE THROAT: 1

## 2025-07-31 NOTE — PROGRESS NOTES
Indicates a positive item  \"[]\" Indicates a negative item  -- DELETE ALL ITEMS NOT EXAMINED]    Constitutional: [x] Appears well-developed and well-nourished [x] No apparent distress      [] Abnormal -     Mental status: [x] Alert and awake  [x] Oriented to person/place/time [x] Able to follow commands    [] Abnormal -     Eyes:   EOM    [x]  Normal    [] Abnormal -   Sclera  [x]  Normal    [] Abnormal -          Discharge [x]  None visible   [] Abnormal -     HENT: [x] Normocephalic, atraumatic  [] Abnormal -   [x] Mouth/Throat: Mucous membranes are moist    External Ears [x] Normal  [] Abnormal -    Neck: [x] No visualized mass [] Abnormal -     Pulmonary/Chest: [x] Respiratory effort normal   [x] No visualized signs of difficulty breathing or respiratory distress        [] Abnormal -      Musculoskeletal:   [x] Normal gait with no signs of ataxia         [x] Normal range of motion of neck        [] Abnormal -     Neurological:        [x] No Facial Asymmetry (Cranial nerve 7 motor function) (limited exam due to video visit)          [x] No gaze palsy        [] Abnormal -          Skin:        [x] No significant exanthematous lesions or discoloration noted on facial skin         [] Abnormal -            Psychiatric:       [x] Normal Affect [] Abnormal -        [x] No Hallucinations    Other pertinent observable physical exam findings:-    --BLANKA Santana on 7/31/2025 at 5:28 PM  An electronic signature was used to authenticate this note.     Vandana Jeong, was evaluated through a synchronous (real-time) audio-video encounter. The patient (or guardian if applicable) is aware that this is a billable service, which includes applicable co-pays. This Virtual Visit was conducted with patient's (and/or legal guardian's) consent. Patient identification was verified, and a caregiver was present when appropriate.   The patient was located at Home: [address] [place] [address]   Provider was located at Home

## 2025-08-12 ENCOUNTER — TELEMEDICINE (OUTPATIENT)
Age: 50
End: 2025-08-12
Payer: COMMERCIAL

## 2025-08-12 DIAGNOSIS — F43.9 TRAUMA AND STRESSOR-RELATED DISORDER: ICD-10-CM

## 2025-08-12 DIAGNOSIS — E66.01 MORBID OBESITY (HCC): ICD-10-CM

## 2025-08-12 DIAGNOSIS — F43.81 PROLONGED GRIEF DISORDER: ICD-10-CM

## 2025-08-12 DIAGNOSIS — F33.2 SEVERE EPISODE OF RECURRENT MAJOR DEPRESSIVE DISORDER, WITHOUT PSYCHOTIC FEATURES (HCC): Primary | ICD-10-CM

## 2025-08-12 PROCEDURE — 90832 PSYTX W PT 30 MINUTES: CPT | Performed by: COUNSELOR

## 2025-08-27 DIAGNOSIS — R63.5 ABNORMAL WEIGHT GAIN: ICD-10-CM

## 2025-08-27 DIAGNOSIS — E03.8 SUBCLINICAL HYPOTHYROIDISM: ICD-10-CM

## 2025-08-27 RX ORDER — ERGOCALCIFEROL 1.25 MG/1
50000 CAPSULE, LIQUID FILLED ORAL WEEKLY
Qty: 12 CAPSULE | Refills: 1 | Status: SHIPPED | OUTPATIENT
Start: 2025-08-27

## 2025-08-27 RX ORDER — PHENTERMINE HYDROCHLORIDE 37.5 MG/1
TABLET ORAL
Qty: 30 TABLET | Refills: 2 | Status: SHIPPED | OUTPATIENT
Start: 2025-08-27 | End: 2025-11-25

## 2025-08-27 RX ORDER — FERROUS SULFATE 325(65) MG
TABLET, DELAYED RELEASE (ENTERIC COATED) ORAL
Qty: 90 TABLET | Refills: 1 | Status: SHIPPED | OUTPATIENT
Start: 2025-08-27

## 2025-08-27 RX ORDER — LEVOTHYROXINE SODIUM 25 UG/1
25 TABLET ORAL DAILY
Qty: 90 TABLET | Refills: 1 | Status: SHIPPED | OUTPATIENT
Start: 2025-08-27

## 2025-09-05 ENCOUNTER — COMMUNITY OUTREACH (OUTPATIENT)
Age: 50
End: 2025-09-05

## (undated) DEVICE — BLUNTFILL WITH FILTER: Brand: MONOJECT

## (undated) DEVICE — BLUNTFILL: Brand: MONOJECT

## (undated) DEVICE — SYRINGE MEDICAL 3ML CLEAR PLASTIC STANDARD NON CONTROL LUERLOCK TIP DISPOSABLE

## (undated) DEVICE — CATHETER IV 22GA L1IN OD0.8382-0.9144MM ID0.6096-0.6858MM 382523

## (undated) DEVICE — SOLIDIFIER FLD 2OZ 1500CC N DISINF IN BTL DISP SAFESORB

## (undated) DEVICE — 1200 GUARD II KIT W/5MM TUBE W/O VAC TUBE: Brand: GUARDIAN

## (undated) DEVICE — CANNULA CUSH AD W/ 14FT TBG

## (undated) DEVICE — KIT COLON W/ 1.1OZ LUB AND 2 END

## (undated) DEVICE — SET ADMIN 16ML TBNG L100IN 2 Y INJ SITE IV PIGGY BK DISP (ORDER IN MULIPLES OF 48)

## (undated) DEVICE — ELECTRODE,RADIOTRANSLUCENT,FOAM,3PK: Brand: MEDLINE

## (undated) DEVICE — IV STRT KT 3282] LSL INDUSTRIES INC]

## (undated) DEVICE — SYRINGE MED 5ML STD CLR PLAS LUERLOCK TIP N CTRL DISP

## (undated) DEVICE — BITEBLOCK ENDOSCP 60FR MAXI WHT POLYETH STURDY W/ VELC WVN